# Patient Record
Sex: MALE | Race: WHITE | NOT HISPANIC OR LATINO | Employment: UNEMPLOYED | URBAN - METROPOLITAN AREA
[De-identification: names, ages, dates, MRNs, and addresses within clinical notes are randomized per-mention and may not be internally consistent; named-entity substitution may affect disease eponyms.]

---

## 2017-10-02 ENCOUNTER — APPOINTMENT (EMERGENCY)
Dept: RADIOLOGY | Facility: HOSPITAL | Age: 47
End: 2017-10-02
Payer: COMMERCIAL

## 2017-10-02 ENCOUNTER — APPOINTMENT (EMERGENCY)
Dept: NON INVASIVE DIAGNOSTICS | Facility: HOSPITAL | Age: 47
End: 2017-10-02
Payer: COMMERCIAL

## 2017-10-02 ENCOUNTER — HOSPITAL ENCOUNTER (EMERGENCY)
Facility: HOSPITAL | Age: 47
Discharge: HOME | End: 2017-10-02
Attending: EMERGENCY MEDICINE | Admitting: EMERGENCY MEDICINE
Payer: COMMERCIAL

## 2017-10-02 VITALS
RESPIRATION RATE: 18 BRPM | TEMPERATURE: 98.1 F | HEART RATE: 61 BPM | BODY MASS INDEX: 29.8 KG/M2 | WEIGHT: 220 LBS | SYSTOLIC BLOOD PRESSURE: 171 MMHG | OXYGEN SATURATION: 97 % | DIASTOLIC BLOOD PRESSURE: 97 MMHG | HEIGHT: 72 IN

## 2017-10-02 DIAGNOSIS — M79.609 LIMB PAIN: ICD-10-CM

## 2017-10-02 DIAGNOSIS — M79.661 PAIN AND SWELLING OF RIGHT LOWER LEG: Primary | ICD-10-CM

## 2017-10-02 DIAGNOSIS — M79.89 PAIN AND SWELLING OF RIGHT LOWER LEG: Primary | ICD-10-CM

## 2017-10-02 LAB
CRP SERPL QL: 9.5 MG/L
ERYTHROCYTE [SEDIMENTATION RATE] IN BLOOD: 3 MM/HOUR (ref 0–10)

## 2017-10-02 PROCEDURE — 85613 RUSSELL VIPER VENOM DILUTED: CPT | Performed by: EMERGENCY MEDICINE

## 2017-10-02 PROCEDURE — 85670 THROMBIN TIME PLASMA: CPT | Performed by: EMERGENCY MEDICINE

## 2017-10-02 PROCEDURE — 86617 LYME DISEASE ANTIBODY: CPT | Performed by: EMERGENCY MEDICINE

## 2017-10-02 PROCEDURE — 86618 LYME DISEASE ANTIBODY: CPT | Performed by: EMERGENCY MEDICINE

## 2017-10-02 PROCEDURE — 93971 EXTREMITY STUDY: CPT

## 2017-10-02 PROCEDURE — 85732 THROMBOPLASTIN TIME PARTIAL: CPT | Performed by: EMERGENCY MEDICINE

## 2017-10-02 PROCEDURE — 36415 COLL VENOUS BLD VENIPUNCTURE: CPT | Performed by: EMERGENCY MEDICINE

## 2017-10-02 PROCEDURE — 73630 X-RAY EXAM OF FOOT: CPT | Performed by: EMERGENCY MEDICINE

## 2017-10-02 PROCEDURE — 99284 EMERGENCY DEPT VISIT MOD MDM: CPT

## 2017-10-02 PROCEDURE — 86038 ANTINUCLEAR ANTIBODIES: CPT | Performed by: EMERGENCY MEDICINE

## 2017-10-02 PROCEDURE — 86430 RHEUMATOID FACTOR TEST QUAL: CPT | Performed by: EMERGENCY MEDICINE

## 2017-10-02 PROCEDURE — 86140 C-REACTIVE PROTEIN: CPT | Performed by: EMERGENCY MEDICINE

## 2017-10-02 PROCEDURE — 85652 RBC SED RATE AUTOMATED: CPT | Performed by: EMERGENCY MEDICINE

## 2017-10-02 PROCEDURE — 85705 THROMBOPLASTIN INHIBITION: CPT | Performed by: EMERGENCY MEDICINE

## 2017-10-02 PROCEDURE — 73610 X-RAY EXAM OF ANKLE: CPT | Performed by: EMERGENCY MEDICINE

## 2017-10-02 RX ORDER — ZOLPIDEM TARTRATE 12.5 MG/1
12.5 TABLET, FILM COATED, EXTENDED RELEASE ORAL
COMMUNITY

## 2017-10-02 RX ORDER — IBUPROFEN 600 MG/1
600 TABLET ORAL EVERY 6 HOURS PRN
Qty: 30 TABLET | Refills: 0 | Status: SHIPPED | OUTPATIENT
Start: 2017-10-02

## 2017-10-02 RX ORDER — HYDROCODONE BITARTRATE AND ACETAMINOPHEN 5; 325 MG/1; MG/1
1 TABLET ORAL EVERY 4 HOURS PRN
Qty: 12 TABLET | Refills: 0 | Status: SHIPPED | OUTPATIENT
Start: 2017-10-02

## 2017-10-02 NOTE — DISCHARGE INSTRUCTIONS
Leg Pain   WHAT YOU NEED TO KNOW:   Leg pain may be caused by a variety of health conditions  Your tests did not show any broken bones or blood clots  DISCHARGE INSTRUCTIONS:   Return to the emergency department if:   · You have a fever  · Your leg starts to swell  · Your leg pain gets worse  · You have numbness or tingling in your leg or toes  · You cannot put any weight on or move your leg  Contact your healthcare provider if:   · Your pain does not decrease, even after treatment  · You have questions or concerns about your condition or care  Medicines:   · NSAIDs , such as ibuprofen, help decrease swelling, pain, and fever  This medicine is available with or without a doctor's order  NSAIDs can cause stomach bleeding or kidney problems in certain people  If you take blood thinner medicine, always ask your healthcare provider if NSAIDs are safe for you  Always read the medicine label and follow directions  · Take your medicine as directed  Contact your healthcare provider if you think your medicine is not helping or if you have side effects  Tell him of her if you are allergic to any medicine  Keep a list of the medicines, vitamins, and herbs you take  Include the amounts, and when and why you take them  Bring the list or the pill bottles to follow-up visits  Carry your medicine list with you in case of an emergency  Follow up with your healthcare provider as directed: You may need more tests to find the cause of your leg pain  You may need to see an orthopedic specialist or a physical therapist  Write down your questions so you remember to ask them during your visits  Manage your leg pain:   · Rest  your injured leg so that it can heal  You may need an immobilizer, brace, or splint to limit the movement of your leg  You may need to avoid putting any weight on your leg for at least 48 hours  Return to normal activities as directed      · Ice  the injury for 20 minutes every 4 hours for up to 24 hours, or as directed  Use an ice pack, or put crushed ice in a plastic bag  Cover it with a towel to protect your skin  Ice helps prevent tissue damage and decreases swelling and pain  · Elevate  your injured leg above the level of your heart as often as you can  This will help decrease swelling and pain  If possible, prop your leg on pillows or blankets to keep the area elevated comfortably  · Use assistive devices as directed  You may need to use a cane or crutches  Assistive devices help decrease pain and pressure on your leg when you walk  Ask your healthcare provider for more information about assistive devices and how to use them correctly  · Maintain a healthy weight  Extra body weight can cause pressure and pain in your hip, knee, and ankle joints  Ask your healthcare provider how much you should weigh  Ask him to help you create a weight loss plan if you are overweight  © 2017 [employer] Information is for End User's use only and may not be sold, redistributed or otherwise used for commercial purposes  All illustrations and images included in CareNotes® are the copyrighted property of [employer]  or [employer]  The above information is an  only  It is not intended as medical advice for individual conditions or treatments  Talk to your doctor, nurse or pharmacist before following any medical regimen to see if it is safe and effective for you  Edema   WHAT YOU NEED TO KNOW:   Edema is swelling throughout your body  Edema is usually a sign that you are retaining fluid  The swelling may be caused by heart failure or kidney, thyroid, or liver disease  It may also be caused by medicines such as antidepressants, blood pressure medicines, or hormones  Sudden swelling around the lips or face may be a sign of a severe allergic reaction  Swelling of an arm or leg may be caused by blockage of your veins     DISCHARGE INSTRUCTIONS:   Return to the emergency department if:   · You have shortness of breath at rest, especially when you lie down  · You cough up pink, foamy sputum  · You have chest pain  · Your heartbeat is fast or uneven  Contact your healthcare provider if:   · The swollen area feels cold and is pale or blue in color  · The swollen area feels warm, painful, and is red in color  · You have increased swelling or swelling in other parts of your body  · You have questions or concerns about your condition or care  Medicines:   · Medicines  help to get rid of extra body fluid  · Take your medicine as directed  Contact your healthcare provider if you think your medicine is not helping or if you have side effects  Tell him or her if you are allergic to any medicine  Keep a list of the medicines, vitamins, and herbs you take  Include the amounts, and when and why you take them  Bring the list or the pill bottles to follow-up visits  Carry your medicine list with you in case of an emergency  Follow up with your healthcare provider as directed:  Write down your questions so you remember to ask them during your visits  Manage edema:   · Elevate  your arms or legs as directed  Raise them above the level of your heart as often as you can  This will help decrease swelling and pain  Prop them on pillows or blankets to keep them elevated comfortably  · Wear pressure stockings as directed  The stockings are tight and put pressure on your legs  This helps to keep fluid from collecting in your legs or ankles  · Limit your salt intake  Salt causes your body to hold water  Ask about any other changes to your diet  · Stay active  Do not stand or sit for long periods of time  Ask your healthcare provider about the best exercise plan for you  · Keep your skin moist  using lotion, cream, or ointment  Ask your healthcare provider what to use and how often to use it    © 2017 [employer] Information is for End User's use only and may not be sold, redistributed or otherwise used for commercial purposes  All illustrations and images included in CareNotes® are the copyrighted property of [employer]  or [employer]  The above information is an  only  It is not intended as medical advice for individual conditions or treatments  Talk to your doctor, nurse or pharmacist before following any medical regimen to see if it is safe and effective for you

## 2017-10-02 NOTE — ED NOTES
Discharge instructions reviewed with patient  All questions and concerns addressed        Juan Jose Arzate RN  10/02/17 1980

## 2017-10-02 NOTE — ED NOTES
Xray at bedside     Timoteo Urena, [address] [facility]  10/02/17 2964

## 2017-10-03 LAB
B BURGDOR IGG SER IA-ACNC: 0.93
B BURGDOR IGM SER IA-ACNC: 0.28
RHEUMATOID FACT SER QL LA: NEGATIVE

## 2017-10-03 NOTE — ED PROVIDER NOTES
History  Chief Complaint   Patient presents with    Ankle Swelling     Patient reports right ankle and foot pain and swelling x1 month  Had XR at [facility] 1 month ago and referred for MRI with progression of pain  Patient reports that pain is not radiating to right shin  Here w/ pain/swelling to right foot/ankle x1m  Had xray at [facility] 1m ago that was neg  Referred for outpt MRI - hasn't had yet  Notes throbbing pain, wrose w/ prolonged standing, better when elevated  Pain in foot/ankle and into shin  Denies pain above the knee  No pain in groin or thigh  No cp/sob  No travel, surg, immob  No trauma  No fh of dvt/pe  No f/c, no changes in wt  No migratory arthralgias  No ticks  History provided by:  Patient   used: No    Ankle Swelling   Location:  Leg, ankle and foot  Time since incident:  1 month  Injury: no    Leg location:  R lower leg  Ankle location:  R ankle  Foot location:  R foot  Pain details:     Quality:  Aching    Radiates to:  Does not radiate    Severity:  Moderate    Onset quality:  Gradual    Duration:  1 month    Timing:  Constant    Progression:  Worsening  Chronicity:  New  Dislocation: no    Prior injury to area:  No  Relieved by:  Elevation  Worsened by:  Bearing weight, exercise and activity  Ineffective treatments:  None tried  Associated symptoms: swelling    Associated symptoms: no back pain, no decreased ROM, no fatigue, no fever, no muscle weakness, no neck pain, no numbness, no stiffness and no tingling    Risk factors: no concern for non-accidental trauma, no obesity and no recent illness        Prior to Admission Medications   Prescriptions Last Dose Informant Patient Reported?  Taking?   zolpidem (AMBIEN CR) 12 5 MG CR tablet   Yes Yes   Sig: Take 12 5 mg by mouth daily at bedtime as needed for sleep      Facility-Administered Medications: None       Past Medical History:   Diagnosis Date    Insomnia        Past Surgical History:   Procedure Laterality Date    WISDOM TOOTH EXTRACTION         History reviewed  No pertinent family history  I have reviewed and agree with the history as documented  Social History   Substance Use Topics    Smoking status: Never Smoker    Smokeless tobacco: Current User     Types: Chew    Alcohol use Not on file        Review of Systems   Constitutional: Negative for fatigue and fever  Respiratory: Negative for chest tightness and shortness of breath  Cardiovascular: Negative for chest pain  Musculoskeletal: Negative for back pain, neck pain and stiffness  All other systems reviewed and are negative  Physical Exam  ED Triage Vitals [10/02/17 0848]   Temperature Pulse Respirations Blood Pressure SpO2   98 1 °F (36 7 °C) 67 16 (!) 176/96 97 %      Temp Source Heart Rate Source Patient Position - Orthostatic VS BP Location FiO2 (%)   Tympanic Monitor Lying Right arm --      Pain Score       7           Physical Exam   Constitutional: He appears well-developed and well-nourished  HENT:   Nose: Nose normal    Mouth/Throat: Oropharynx is clear and moist    Eyes: Conjunctivae are normal    Neck: Neck supple  Cardiovascular: Normal rate  Pulmonary/Chest: Effort normal    Musculoskeletal: He exhibits edema (1-2+ ptting right leg/ankle) and tenderness  He exhibits no deformity  Neurological: He is alert  Skin: Skin is warm  No rash noted  Psychiatric: He has a normal mood and affect  Nursing note and vitals reviewed  ED Medications  Medications - No data to display    Diagnostic Studies  Labs Reviewed - No data to display    VAS lower limb venous duplex study, unilateral/limited   Final Result      XR ankle 3+ views RIGHT   Final Result      Soft tissue swelling  No acute osseous abnormality  Workstation performed: [id number]         XR foot 3+ views RIGHT   Final Result      No acute osseous abnormality           Workstation performed: [id number]           Venous duplex - negative    Procedures  Procedures      Phone Contacts  ED Phone Contact    ED Course  ED Course                                MDM  Number of Diagnoses or Management Options  Pain and swelling of right lower leg: new and requires workup     Amount and/or Complexity of Data Reviewed  Clinical lab tests: ordered and reviewed  Tests in the radiology section of CPT®: ordered and reviewed      CritCare Time    Disposition  Final diagnoses:   Pain and swelling of right lower leg     ED Disposition     ED Disposition Condition Comment    Discharge  Kiya Maddox discharge to home/self care  Condition at discharge: Good        Follow-up Information     Follow up With Specialties Details Why [id number] Lucy Kay DO Family Medicine In 1 week If symptoms worsen [id number] [address] [facility] 1  [phone]          Discharge Medication List as of 10/2/2017 11:02 AM      START taking these medications    Details   HYDROcodone-acetaminophen (NORCO) 5-325 mg per tablet Take 1 tablet by mouth every 4 (four) hours as needed for pain Max Daily Amount: 6 tablets, Starting Mon 10/2/2017, Print      ibuprofen (MOTRIN) 600 mg tablet Take 1 tablet by mouth every 6 (six) hours as needed for mild pain or moderate pain, Starting Mon 10/2/2017, Print         CONTINUE these medications which have NOT CHANGED    Details   zolpidem (AMBIEN CR) 12 5 MG CR tablet Take 12 5 mg by mouth daily at bedtime as needed for sleep, Historical Med           No discharge procedures on file      ED Provider  Electronically Signed by       Naomy Guillory DO  10/02/17 2005

## 2017-10-04 LAB
APTT SCREEN TO CONFIRM RATIO: 1.03 RATIO (ref 0–1.4)
B BURGDOR IGG PATRN SER IB-IMP: NEGATIVE
B BURGDOR IGM PATRN SER IB-IMP: NEGATIVE
B BURGDOR18KD IGG SER QL IB: NORMAL
B BURGDOR23KD IGG SER QL IB: NORMAL
B BURGDOR23KD IGM SER QL IB: NORMAL
B BURGDOR28KD IGG SER QL IB: NORMAL
B BURGDOR30KD IGG SER QL IB: NORMAL
B BURGDOR39KD IGG SER QL IB: NORMAL
B BURGDOR39KD IGM SER QL IB: NORMAL
B BURGDOR41KD IGG SER QL IB: NORMAL
B BURGDOR41KD IGM SER QL IB: NORMAL
B BURGDOR45KD IGG SER QL IB: NORMAL
B BURGDOR58KD IGG SER QL IB: NORMAL
B BURGDOR66KD IGG SER QL IB: NORMAL
B BURGDOR93KD IGG SER QL IB: NORMAL
CONFIRM APTT/NORMAL: 40.6 SEC (ref 0–55)
LA PPP-IMP: NORMAL
RYE IGE QN: NEGATIVE
SCREEN APTT: 45.8 SEC (ref 0–51.9)
SCREEN DRVVT: 44.1 SEC (ref 0–47)
THROMBIN TIME: 18.7 SEC (ref 0–23)

## 2023-07-10 ENCOUNTER — APPOINTMENT (EMERGENCY)
Dept: CT IMAGING | Facility: HOSPITAL | Age: 53
DRG: 065 | End: 2023-07-10
Payer: COMMERCIAL

## 2023-07-10 ENCOUNTER — HOSPITAL ENCOUNTER (INPATIENT)
Facility: HOSPITAL | Age: 53
LOS: 3 days | Discharge: HOME | DRG: 065 | End: 2023-07-13
Attending: EMERGENCY MEDICINE | Admitting: STUDENT IN AN ORGANIZED HEALTH CARE EDUCATION/TRAINING PROGRAM
Payer: COMMERCIAL

## 2023-07-10 ENCOUNTER — APPOINTMENT (INPATIENT)
Dept: MRI IMAGING | Facility: HOSPITAL | Age: 53
DRG: 065 | End: 2023-07-10
Payer: COMMERCIAL

## 2023-07-10 DIAGNOSIS — Z78.9 ALCOHOL USE: ICD-10-CM

## 2023-07-10 DIAGNOSIS — R29.90 STROKE-LIKE EPISODE: ICD-10-CM

## 2023-07-10 DIAGNOSIS — D69.6 THROMBOCYTOPENIA (HCC): ICD-10-CM

## 2023-07-10 DIAGNOSIS — R29.898 LEFT ARM WEAKNESS: Primary | ICD-10-CM

## 2023-07-10 DIAGNOSIS — R74.01 TRANSAMINITIS: ICD-10-CM

## 2023-07-10 LAB
2HR DELTA HS TROPONIN: 0 NG/L
4HR DELTA HS TROPONIN: 0 NG/L
ALBUMIN SERPL BCP-MCNC: 3.9 G/DL (ref 3.5–5)
ALP SERPL-CCNC: 125 U/L (ref 34–104)
ALT SERPL W P-5'-P-CCNC: 23 U/L (ref 7–52)
AMMONIA PLAS-SCNC: 72 UMOL/L (ref 18–72)
ANION GAP SERPL CALCULATED.3IONS-SCNC: 8 MMOL/L
APTT PPP: 37 SECONDS (ref 23–37)
AST SERPL W P-5'-P-CCNC: 49 U/L (ref 13–39)
BILIRUB DIRECT SERPL-MCNC: 0.44 MG/DL (ref 0–0.2)
BILIRUB SERPL-MCNC: 1.08 MG/DL (ref 0.2–1)
BUN SERPL-MCNC: 11 MG/DL (ref 5–25)
CALCIUM SERPL-MCNC: 9.5 MG/DL (ref 8.4–10.2)
CARDIAC TROPONIN I PNL SERPL HS: 6 NG/L
CHLORIDE SERPL-SCNC: 108 MMOL/L (ref 96–108)
CHOLEST SERPL-MCNC: 136 MG/DL
CO2 SERPL-SCNC: 24 MMOL/L (ref 21–32)
CREAT SERPL-MCNC: 1.18 MG/DL (ref 0.6–1.3)
ERYTHROCYTE [DISTWIDTH] IN BLOOD BY AUTOMATED COUNT: 14.2 % (ref 11.6–15.1)
ETHANOL SERPL-MCNC: 103 MG/DL
GFR SERPL CREATININE-BSD FRML MDRD: 70 ML/MIN/1.73SQ M
GLUCOSE SERPL-MCNC: 109 MG/DL (ref 65–140)
HAV AB SER QL IA: NORMAL
HAV IGM SER QL: NORMAL
HBV CORE AB SER QL: NORMAL
HBV CORE IGM SER QL: NORMAL
HBV SURFACE AB SER-ACNC: >500 MIU/ML
HBV SURFACE AG SER QL: NORMAL
HCT VFR BLD AUTO: 38 % (ref 36.5–49.3)
HCV AB SER QL: NORMAL
HDLC SERPL-MCNC: 38 MG/DL
HGB BLD-MCNC: 12.1 G/DL (ref 12–17)
INR PPP: 1.25 (ref 0.84–1.19)
LDLC SERPL CALC-MCNC: 64 MG/DL (ref 0–100)
MCH RBC QN AUTO: 28.9 PG (ref 26.8–34.3)
MCHC RBC AUTO-ENTMCNC: 31.8 G/DL (ref 31.4–37.4)
MCV RBC AUTO: 91 FL (ref 82–98)
PLATELET # BLD AUTO: 120 THOUSANDS/UL (ref 149–390)
PMV BLD AUTO: 10.4 FL (ref 8.9–12.7)
POTASSIUM SERPL-SCNC: 4 MMOL/L (ref 3.5–5.3)
PROT SERPL-MCNC: 6.7 G/DL (ref 6.4–8.4)
PROTHROMBIN TIME: 16.5 SECONDS (ref 11.6–14.5)
RBC # BLD AUTO: 4.19 MILLION/UL (ref 3.88–5.62)
SODIUM SERPL-SCNC: 140 MMOL/L (ref 135–147)
TRIGL SERPL-MCNC: 170 MG/DL
WBC # BLD AUTO: 7.53 THOUSAND/UL (ref 4.31–10.16)

## 2023-07-10 PROCEDURE — A9585 GADOBUTROL INJECTION: HCPCS | Performed by: STUDENT IN AN ORGANIZED HEALTH CARE EDUCATION/TRAINING PROGRAM

## 2023-07-10 PROCEDURE — 87389 HIV-1 AG W/HIV-1&-2 AB AG IA: CPT | Performed by: PHYSICIAN ASSISTANT

## 2023-07-10 PROCEDURE — 80061 LIPID PANEL: CPT | Performed by: STUDENT IN AN ORGANIZED HEALTH CARE EDUCATION/TRAINING PROGRAM

## 2023-07-10 PROCEDURE — 82077 ASSAY SPEC XCP UR&BREATH IA: CPT | Performed by: STUDENT IN AN ORGANIZED HEALTH CARE EDUCATION/TRAINING PROGRAM

## 2023-07-10 PROCEDURE — 99291 CRITICAL CARE FIRST HOUR: CPT | Performed by: EMERGENCY MEDICINE

## 2023-07-10 PROCEDURE — 86706 HEP B SURFACE ANTIBODY: CPT | Performed by: STUDENT IN AN ORGANIZED HEALTH CARE EDUCATION/TRAINING PROGRAM

## 2023-07-10 PROCEDURE — 85730 THROMBOPLASTIN TIME PARTIAL: CPT | Performed by: EMERGENCY MEDICINE

## 2023-07-10 PROCEDURE — 84484 ASSAY OF TROPONIN QUANT: CPT | Performed by: EMERGENCY MEDICINE

## 2023-07-10 PROCEDURE — NC001 PR NO CHARGE: Performed by: STUDENT IN AN ORGANIZED HEALTH CARE EDUCATION/TRAINING PROGRAM

## 2023-07-10 PROCEDURE — 36415 COLL VENOUS BLD VENIPUNCTURE: CPT | Performed by: EMERGENCY MEDICINE

## 2023-07-10 PROCEDURE — 86704 HEP B CORE ANTIBODY TOTAL: CPT | Performed by: STUDENT IN AN ORGANIZED HEALTH CARE EDUCATION/TRAINING PROGRAM

## 2023-07-10 PROCEDURE — 85610 PROTHROMBIN TIME: CPT | Performed by: EMERGENCY MEDICINE

## 2023-07-10 PROCEDURE — 70498 CT ANGIOGRAPHY NECK: CPT

## 2023-07-10 PROCEDURE — 80076 HEPATIC FUNCTION PANEL: CPT | Performed by: STUDENT IN AN ORGANIZED HEALTH CARE EDUCATION/TRAINING PROGRAM

## 2023-07-10 PROCEDURE — 99285 EMERGENCY DEPT VISIT HI MDM: CPT

## 2023-07-10 PROCEDURE — 99223 1ST HOSP IP/OBS HIGH 75: CPT | Performed by: STUDENT IN AN ORGANIZED HEALTH CARE EDUCATION/TRAINING PROGRAM

## 2023-07-10 PROCEDURE — 92610 EVALUATE SWALLOWING FUNCTION: CPT

## 2023-07-10 PROCEDURE — 86708 HEPATITIS A ANTIBODY: CPT | Performed by: STUDENT IN AN ORGANIZED HEALTH CARE EDUCATION/TRAINING PROGRAM

## 2023-07-10 PROCEDURE — 85027 COMPLETE CBC AUTOMATED: CPT | Performed by: EMERGENCY MEDICINE

## 2023-07-10 PROCEDURE — 70553 MRI BRAIN STEM W/O & W/DYE: CPT

## 2023-07-10 PROCEDURE — 83036 HEMOGLOBIN GLYCOSYLATED A1C: CPT | Performed by: STUDENT IN AN ORGANIZED HEALTH CARE EDUCATION/TRAINING PROGRAM

## 2023-07-10 PROCEDURE — 80307 DRUG TEST PRSMV CHEM ANLYZR: CPT | Performed by: STUDENT IN AN ORGANIZED HEALTH CARE EDUCATION/TRAINING PROGRAM

## 2023-07-10 PROCEDURE — 93005 ELECTROCARDIOGRAM TRACING: CPT

## 2023-07-10 PROCEDURE — 80048 BASIC METABOLIC PNL TOTAL CA: CPT | Performed by: EMERGENCY MEDICINE

## 2023-07-10 PROCEDURE — 82140 ASSAY OF AMMONIA: CPT | Performed by: STUDENT IN AN ORGANIZED HEALTH CARE EDUCATION/TRAINING PROGRAM

## 2023-07-10 PROCEDURE — 70496 CT ANGIOGRAPHY HEAD: CPT

## 2023-07-10 PROCEDURE — 80074 ACUTE HEPATITIS PANEL: CPT | Performed by: STUDENT IN AN ORGANIZED HEALTH CARE EDUCATION/TRAINING PROGRAM

## 2023-07-10 PROCEDURE — 99245 OFF/OP CONSLTJ NEW/EST HI 55: CPT | Performed by: STUDENT IN AN ORGANIZED HEALTH CARE EDUCATION/TRAINING PROGRAM

## 2023-07-10 RX ORDER — LOPERAMIDE HYDROCHLORIDE 2 MG/1
2 CAPSULE ORAL 4 TIMES DAILY PRN
Status: DISCONTINUED | OUTPATIENT
Start: 2023-07-10 | End: 2023-07-13 | Stop reason: HOSPADM

## 2023-07-10 RX ORDER — HYDROXYZINE HYDROCHLORIDE 25 MG/1
25 TABLET, FILM COATED ORAL EVERY 6 HOURS PRN
COMMUNITY

## 2023-07-10 RX ORDER — GADOBUTROL 604.72 MG/ML
10 INJECTION INTRAVENOUS
Status: COMPLETED | OUTPATIENT
Start: 2023-07-10 | End: 2023-07-10

## 2023-07-10 RX ORDER — NICOTINE 21 MG/24HR
1 PATCH, TRANSDERMAL 24 HOURS TRANSDERMAL DAILY
Status: DISCONTINUED | OUTPATIENT
Start: 2023-07-10 | End: 2023-07-13 | Stop reason: HOSPADM

## 2023-07-10 RX ORDER — LOSARTAN POTASSIUM 100 MG/1
100 TABLET ORAL 2 TIMES DAILY
COMMUNITY

## 2023-07-10 RX ORDER — ASPIRIN 81 MG/1
81 TABLET, CHEWABLE ORAL DAILY
Status: DISCONTINUED | OUTPATIENT
Start: 2023-07-11 | End: 2023-07-13 | Stop reason: HOSPADM

## 2023-07-10 RX ORDER — ACETAMINOPHEN 325 MG/1
650 TABLET ORAL EVERY 6 HOURS PRN
Status: DISCONTINUED | OUTPATIENT
Start: 2023-07-10 | End: 2023-07-13 | Stop reason: HOSPADM

## 2023-07-10 RX ORDER — LOPERAMIDE HYDROCHLORIDE 2 MG/1
2 CAPSULE ORAL 4 TIMES DAILY PRN
COMMUNITY

## 2023-07-10 RX ORDER — CLOPIDOGREL BISULFATE 75 MG/1
300 TABLET ORAL ONCE
Status: COMPLETED | OUTPATIENT
Start: 2023-07-10 | End: 2023-07-10

## 2023-07-10 RX ORDER — ASPIRIN 325 MG
325 TABLET ORAL ONCE
Status: COMPLETED | OUTPATIENT
Start: 2023-07-10 | End: 2023-07-10

## 2023-07-10 RX ORDER — CHLORTHALIDONE 25 MG/1
25 TABLET ORAL DAILY
COMMUNITY

## 2023-07-10 RX ORDER — ZOLPIDEM TARTRATE 5 MG/1
10 TABLET ORAL
Status: DISCONTINUED | OUTPATIENT
Start: 2023-07-10 | End: 2023-07-13 | Stop reason: HOSPADM

## 2023-07-10 RX ORDER — ROPINIROLE 0.5 MG/1
0.5 TABLET, FILM COATED ORAL
COMMUNITY

## 2023-07-10 RX ORDER — ROPINIROLE 0.25 MG/1
0.5 TABLET, FILM COATED ORAL
Status: DISCONTINUED | OUTPATIENT
Start: 2023-07-10 | End: 2023-07-13 | Stop reason: HOSPADM

## 2023-07-10 RX ORDER — ATORVASTATIN CALCIUM 40 MG/1
40 TABLET, FILM COATED ORAL EVERY EVENING
Status: DISCONTINUED | OUTPATIENT
Start: 2023-07-10 | End: 2023-07-11

## 2023-07-10 RX ORDER — METOPROLOL SUCCINATE 100 MG/1
100 TABLET, EXTENDED RELEASE ORAL DAILY
COMMUNITY

## 2023-07-10 RX ORDER — LORAZEPAM 2 MG/ML
0.5 INJECTION INTRAMUSCULAR ONCE
Status: COMPLETED | OUTPATIENT
Start: 2023-07-10 | End: 2023-07-10

## 2023-07-10 RX ORDER — AMLODIPINE BESYLATE 10 MG/1
10 TABLET ORAL DAILY
COMMUNITY
End: 2023-07-13

## 2023-07-10 RX ADMIN — LOPERAMIDE HYDROCHLORIDE 2 MG: 2 CAPSULE ORAL at 21:21

## 2023-07-10 RX ADMIN — ZOLPIDEM TARTRATE 10 MG: 5 TABLET ORAL at 22:32

## 2023-07-10 RX ADMIN — ASPIRIN 325 MG: 325 TABLET ORAL at 12:18

## 2023-07-10 RX ADMIN — LORAZEPAM 0.5 MG: 2 INJECTION INTRAMUSCULAR; INTRAVENOUS at 18:45

## 2023-07-10 RX ADMIN — CLOPIDOGREL BISULFATE 300 MG: 75 TABLET ORAL at 12:00

## 2023-07-10 RX ADMIN — GADOBUTROL 10 ML: 604.72 INJECTION INTRAVENOUS at 15:16

## 2023-07-10 RX ADMIN — ACETAMINOPHEN 650 MG: 325 TABLET, FILM COATED ORAL at 21:21

## 2023-07-10 RX ADMIN — NICOTINE 1 PATCH: 14 PATCH, EXTENDED RELEASE TRANSDERMAL at 17:21

## 2023-07-10 RX ADMIN — IOHEXOL 85 ML: 350 INJECTION, SOLUTION INTRAVENOUS at 11:06

## 2023-07-10 RX ADMIN — ATORVASTATIN CALCIUM 40 MG: 40 TABLET, FILM COATED ORAL at 18:44

## 2023-07-10 RX ADMIN — ROPINIROLE HYDROCHLORIDE 0.5 MG: 0.25 TABLET, FILM COATED ORAL at 22:32

## 2023-07-10 NOTE — ASSESSMENT & PLAN NOTE
· Present on admission- slurred speech, weakness, double vision  · No previous history of stroke  · Initial neuroimaging negative for acute pathology  · Neurology was consulted, not a candidate for tPA  Admit to stroke pathway, follow up results   · Loaded with aspirin and plavix in ED  · Neurology recommending further evaluation of cirrhosis- check LFT, hepatitis panel, alcohol level, etc
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
## 2023-07-10 NOTE — SPEECH THERAPY NOTE
Speech Language/Pathology    Speech-Language Pathology Bedside Swallow Evaluation      Patient Name: Gonzalo Sheriff    [id number] Date: 7/10/2023     Problem List  Principal Problem:    Stroke-like episode      Past Medical History  Past Medical History:   Diagnosis Date   • Insomnia        Past Surgical History  Past Surgical History:   Procedure Laterality Date   • WISDOM TOOTH EXTRACTION         Summary   Pt presented with s/s suggestive of minimal oral dysphagia. Pt w/ lethargy and slowed oral manipulation though effective mastication of all material presented. Adequate transfers without significant residue. Swallows suspected fairly prompt. No overt s/s aspiration. Education provided on strategies to optimize swallow safety and s/s aspiration to notify medical team of should they arise. Pt demonstrated understanding and denied questions/concerns at this time. Risk/s for Aspiration: Low      Recommended Diet: regular diet and thin liquids   Recommended Form of Meds: whole with liquid   Aspiration precautions and swallowing strategies: upright posture, only feed when fully alert and slow rate of feeding  Other Recommendations: Continue frequent oral care        Current Medical Status  Pt is a 47 y.o. male who presented to [address] [place] [address] with  History of htn, insomnia, reports he was tired last night but no other symptoms, he was his normal state of health,he went to bed at around 10 p.m., he awoken this a..m at 7:30 he was off balance/slurred speech, when he was driving to work he noted horizontal diplopia which is improving, when he covered the other eye it improved, but worsened when the eye was not covered. This never happened before. It was worse at a distance, he could not tell if worse at one side or another. He reports his diplopia is improving. When he was walking following to the right. The patient did have vertigo.   He awoken with this symptoms.      The patient went to work and logged into a computer system, he could not log into the computer, he could not figure out his user name or pass word, he was unable to coordinate his fingers on the right at that time. He was having problems with right handed coordination. He was constantly stubbing on the right side. He felt as if he was drunk.      He was able to walk to the car, he was brought to the ED by his wife whom picked him up.      He does take ambien, on no opioids, amlodipine, lisinopril, and metoprolol. He takes Requip for RLS.    CT of head, CTA of head and neck no lvo.     He is not a awake up stroke candidate.      The patient is not a tnk candidate, out of the window, not thrombectomy candidate. Current Precautions: Allergies:  No known food allergies    Past medical history:  Please see H&P for details    Special Studies:  CT stroke alert brain 7/10: No acute intracranial abnormality    CTA stroke alert head/neck 7/10: No flow-limiting cerebrovascular stenosis or occlusion in the neck.     No flow-limiting cerebrovascular stenosis or occlusion in the head      Social/Education/Vocational Hx:  Pt lives with family    Swallow Information   Current Risks for Dysphagia & Aspiration: stroke-like symptoms  Current Symptoms/Concerns: dysarthria, failed screen  Current Diet: NPO   Baseline Diet: regular diet and thin liquids      Baseline Assessment   Behavior/Cognition: lethargic  Speech/Language Status: able to participate in conversation and able to follow commands, ?min dysarthria vs lethargy   Patient Positioning: upright in bed  Pain Status/Interventions/Response to Interventions:  No report of or nonverbal indications of pain.        Swallow Mechanism Exam  Facial: symmetrical  Labial: WFL  Lingual: WFL  Velum: symmetrical  Mandible: adequate ROM  Dentition: adequate  Vocal quality:clear/adequate   Volitional Cough: strong/productive   Respiratory Status: on RA      Consistencies Assessed and Performance   Consistencies Administered: thin liquids, puree, soft solids and hard solids    Oral Stage: minimal  Mastication was adequate with the materials administered today, min slowed lingual manipulation. Bolus formation and transfer were functional with no significant oral residue noted. No overt s/s reduced oral control. Pharyngeal Stage: WFL  Swallow Mechanics:  Swallowing initiation appeared prompt. Laryngeal rise was palpated and judged to be within functional limits. No coughing, throat clearing, change in vocal quality or respiratory status noted today. Esophageal Concerns: none reported    Strategies and Efficacy: -     Summary and Recommendations (see above)    Results Reviewed with: patient, RN and family     Treatment Recommended: Yes     Frequency of treatment: Brief f/u to assess across larger sample as able/determine need for further speech evaluation     Patient Stated Goal: "Kind of hungry kind of not"     Dysphagia LTG  -Patient will demonstrate safe and effective oral intake (without overt s/s significant oral/pharyngeal dysphagia including s/s penetration or aspiration) for the highest appropriate diet level.        Speech Therapy Prognosis   Prognosis: good    Prognosis Considerations: age, medical status, prior medical history and cognitive status

## 2023-07-10 NOTE — LETTER
[facility]  [address] [place] [id number]  Dept: [phone]    July 13, 2023     Patient: Gerson Culp   YOB: 1970   Date of Visit: 7/10/2023       To Whom it May Concern:    Gerson Culp is under my professional care. He was seen in the hospital from 7/10/2023 to 07/13/23. He  may return to work on 7/20/2023 pending follow-up with PCP . If you have any questions or concerns, please don't hesitate to call.          Sincerely,          Chicho Sheppard PA-C

## 2023-07-10 NOTE — ASSESSMENT & PLAN NOTE
47year old male, who presents with horizontal diplopia, as well as dysarthria and a feeling of being pulled to the right. NIH of 1. The patient is not at tnk candidate, as he is out of the window secondary to time. The patient is not a thrombectomy candidate, no lvo seen or critical care stenosis. The patient was not a candidate for awake up stroke protocol with his low NIH. Labs and testing:  MRI the brain with no acute infarct, edema or pathological intra-axial enhancement. CTA of the head and neck showed no flow-limiting stenosis or occlusion the neck or head. Echo  LDL was 64, trig 170H  Ammonia was 72  HIV normal  TSH normal  Ethanol was 103  Platelets 074 L  AST 49    Suspect MRI negative stroke vs TIA. - Stroke pathway, admit to medicine team  • Echo with bubble study  • Hemoglobin A1c pending   • Load with Aspirin 325 mg once, Plavix 300 mg once, followed by Aspirin 81 mg and Plavix 75 mg, starting 7/11, this will be completed for 21 days, than aspirin monotherapy. • Atorvastatin 40 mg, will decrease dose to 10 mg as he has elevated AST and signs of liver disease - as well LDL was 64. • Normotensive blood pressure  • Continue telemetry  • PT/OT/ST  • Frequent neuro checks. Continue to monitor and notify neurology with any changes. - Medical management and supportive care per primary team. Correction of any metabolic or infectious disturbances. The patient had an elevated ammonia, decreased plts elevated AST. -Counseled on etoh cessation.   -Examined alongside attending, please see attestation for details.

## 2023-07-10 NOTE — H&P
[address] [facility]  H&P  Name: Gonzalo Sheriff 47 y.o. male I MRN: [id number]  Unit/Bed#: ED 02 I Date of Admission: 7/10/2023   Date of Service: 7/10/2023 I Hospital Day: 0      Assessment/Plan   * Stroke-like episode  Assessment & Plan  · Present on admission- slurred speech, weakness, double vision  · No previous history of stroke  · Initial neuroimaging negative for acute pathology  · Neurology was consulted, not a candidate for tPA  Admit to stroke pathway, follow up results   · Loaded with aspirin and plavix in ED  · Neurology recommending further evaluation of cirrhosis- check LFT, hepatitis panel, alcohol level, etc     Alcohol abuse   Ethanol level elevated on arrival   Reports drinking at least 3 mixed drinks daily   No active signs of withdrawal currently, start CIWA and monitor   Previous CT AP at another hospital in March showing fatty liver     VTE Pharmacologic Prophylaxis:   Low Risk (Score 0-2) - Encourage Ambulation. Code Status: Level 1 - Full Code       Anticipated Length of Stay: Patient will be admitted on an inpatient basis with an anticipated length of stay of greater than 2 midnights secondary to stroke pathway. Total Time Spent on Date of Encounter in care of patient: 40 minutes This time was spent on one or more of the following: performing physical exam; counseling and coordination of care; obtaining or reviewing history; documenting in the medical record; reviewing/ordering tests, medications or procedures; communicating with other healthcare professionals and discussing with patient's family/caregivers. Chief Complaint: stroke like symptoms     History of Present Illness:  Gonzalo Sheriff is a 47 y.o. male with a PMH of smoker who presents with stroke like symptoms. Came to the ED for further evaluation. NIH score 1 so not a candidate for tNK. Initial neuro imaging negative for acute pathology. However, patient admitted for stroke pathway.      Review of Systems:  Review of Systems   Constitutional: Negative for chills and fever. HENT: Negative for ear pain and sore throat. Eyes: Positive for visual disturbance. Negative for pain. Respiratory: Negative for cough and shortness of breath. Cardiovascular: Negative for chest pain and palpitations. Gastrointestinal: Negative for abdominal pain and vomiting. Genitourinary: Negative for dysuria and hematuria. Musculoskeletal: Negative for arthralgias and back pain. Skin: Negative for color change and rash. Neurological: Positive for speech difficulty and weakness. Negative for seizures and syncope. All other systems reviewed and are negative. Past Medical and Surgical History:   Past Medical History:   Diagnosis Date   • Insomnia        Past Surgical History:   Procedure Laterality Date   • WISDOM TOOTH EXTRACTION         Meds/Allergies:  Prior to Admission medications    Medication Sig Start Date End Date Taking? Authorizing Provider   HYDROcodone-acetaminophen (NORCO) 5-325 mg per tablet Take 1 tablet by mouth every 4 (four) hours as needed for pain Max Daily Amount: 6 tablets 10/2/17   Florinda Henriquez DO   ibuprofen (MOTRIN) 600 mg tablet Take 1 tablet by mouth every 6 (six) hours as needed for mild pain or moderate pain 10/2/17   Florinda Henriquez DO   zolpidem (AMBIEN CR) 12.5 MG CR tablet Take 12.5 mg by mouth daily at bedtime as needed for sleep    Historical Provider, MD HURST have reviewed home medications using recent Epic encounter.     Allergies: No Known Allergies    Social History:  Marital Status: /Civil Union   Occupation: na  Patient Pre-hospital Living Situation: Home  Patient Pre-hospital Level of Mobility: walks  Patient Pre-hospital Diet Restrictions: na  Substance Use History:   Social History     Substance and Sexual Activity   Alcohol Use Never     Social History     Tobacco Use   Smoking Status Never   Smokeless Tobacco Current   • Types: Chew     Social History Substance and Sexual Activity   Drug Use No       Family History:  History reviewed. No pertinent family history. Physical Exam:     Vitals:   Blood Pressure: 111/76 (07/10/23 1140)  Pulse: 65 (07/10/23 1140)  Temperature: 97.8 °F (36.6 °C) (07/10/23 1140)  Temp Source: Oral (07/10/23 1140)  Respirations: 17 (07/10/23 1140)  SpO2: 95 % (07/10/23 1140)    Physical Exam  Constitutional:       General: He is not in acute distress. Appearance: Normal appearance. He is not toxic-appearing. Cardiovascular:      Rate and Rhythm: Normal rate and regular rhythm. Heart sounds: Normal heart sounds. No murmur heard. Pulmonary:      Effort: Pulmonary effort is normal. No respiratory distress. Breath sounds: Normal breath sounds. No wheezing. Abdominal:      General: Abdomen is flat. There is no distension. Palpations: Abdomen is soft. Tenderness: There is no abdominal tenderness. Neurological:      Mental Status: He is alert and oriented to person, place, and time. Motor: Weakness present. Additional Data:     Lab Results:  Results from last 7 days   Lab Units 07/10/23  1056   WBC Thousand/uL 7.53   HEMOGLOBIN g/dL 12.1   HEMATOCRIT % 38.0   PLATELETS Thousands/uL 120*     Results from last 7 days   Lab Units 07/10/23  1056   SODIUM mmol/L 140   POTASSIUM mmol/L 4.0   CHLORIDE mmol/L 108   CO2 mmol/L 24   BUN mg/dL 11   CREATININE mg/dL 1.18   ANION GAP mmol/L 8   CALCIUM mg/dL 9.5   GLUCOSE RANDOM mg/dL 109     Results from last 7 days   Lab Units 07/10/23  1056   INR  1.25*                   Lines/Drains:  Invasive Devices     Peripheral Intravenous Line  Duration           Peripheral IV 07/10/23 Distal;Right;Upper;Ventral (anterior) Arm <1 day                    Imaging: Reviewed radiology reports from this admission including: CT head  CT stroke alert brain   Final Result by Radhika Klein MD (07/10 1123)      No acute intracranial abnormality.          I reported these results to Dorita Soliman Arely via HIPAA compliant secure electronic messaging on 7/10/2023 11:15 AM.      Workstation performed: [id number]         CTA stroke alert (head/neck)   Final Result by Hayden Samuel MD (07/10 1123)      No flow-limiting cerebrovascular stenosis or occlusion in the neck. No flow-limiting cerebrovascular stenosis or occlusion in the head. I reported these results to Dorita Jourdan Mcgee via HIPAA compliant secure electronic messaging on 7/10/2023 11:15 AM.                        Workstation performed: [id number]         MRI Inpatient Order    (Results Pending)       EKG and Other Studies Reviewed on Admission:   · EKG: pending scan into epic chart. ** Please Note: This note has been constructed using a voice recognition system.  **

## 2023-07-10 NOTE — PLAN OF CARE
Problem: Potential for Falls  Goal: Patient will remain free of falls  Description: INTERVENTIONS:  - Educate patient/family on patient safety including physical limitations  - Instruct patient to call for assistance with activity   - Consult OT/PT to assist with strengthening/mobility   - Keep Call bell within reach  - Keep bed low and locked with side rails adjusted as appropriate  - Keep care items and personal belongings within reach  - Initiate and maintain comfort rounds  - Make Fall Risk Sign visible to staff  - Offer Toileting every x Hours, in advance of need  - Initiate/Maintain xalarm  - Obtain necessary fall risk management equipment: x  - Apply yellow socks and bracelet for high fall risk patients  - Consider moving patient to room near nurses station  Outcome: Progressing     Problem: PAIN - ADULT  Goal: Verbalizes/displays adequate comfort level or baseline comfort level  Description: Interventions:  - Encourage patient to monitor pain and request assistance  - Assess pain using appropriate pain scale  - Administer analgesics based on type and severity of pain and evaluate response  - Implement non-pharmacological measures as appropriate and evaluate response  - Consider cultural and social influences on pain and pain management  - Notify physician/advanced practitioner if interventions unsuccessful or patient reports new pain  Outcome: Progressing     Problem: INFECTION - ADULT  Goal: Absence or prevention of progression during hospitalization  Description: INTERVENTIONS:  - Assess and monitor for signs and symptoms of infection  - Monitor lab/diagnostic results  - Monitor all insertion sites, i.e. indwelling lines, tubes, and drains  - Monitor endotracheal if appropriate and nasal secretions for changes in amount and color  - [place] appropriate cooling/warming therapies per order  - Administer medications as ordered  - Instruct and encourage patient and family to use good hand hygiene technique  - Identify and instruct in appropriate isolation precautions for identified infection/condition  Outcome: Progressing  Goal: Absence of fever/infection during neutropenic period  Description: INTERVENTIONS:  - Monitor WBC    Outcome: Progressing     Problem: SAFETY ADULT  Goal: Patient will remain free of falls  Description: INTERVENTIONS:  - Educate patient/family on patient safety including physical limitations  - Instruct patient to call for assistance with activity   - Consult OT/PT to assist with strengthening/mobility   - Keep Call bell within reach  - Keep bed low and locked with side rails adjusted as appropriate  - Keep care items and personal belongings within reach  - Initiate and maintain comfort rounds  - Make Fall Risk Sign visible to staff  - Offer Toileting every x Hours, in advance of need  - Initiate/Maintain xalarm  - Obtain necessary fall risk management equipment: x  - Apply yellow socks and bracelet for high fall risk patients  - Consider moving patient to room near nurses station  Outcome: Progressing  Goal: Maintain or return to baseline ADL function  Description: INTERVENTIONS:  -  Assess patient's ability to carry out ADLs; assess patient's baseline for ADL function and identify physical deficits which impact ability to perform ADLs (bathing, care of mouth/teeth, toileting, grooming, dressing, etc.)  - Assess/evaluate cause of self-care deficits   - Assess range of motion  - Assess patient's mobility; develop plan if impaired  - Assess patient's need for assistive devices and provide as appropriate  - Encourage maximum independence but intervene and supervise when necessary  - Involve family in performance of ADLs  - Assess for home care needs following discharge   - Consider OT consult to assist with ADL evaluation and planning for discharge  - Provide patient education as appropriate  Outcome: Progressing  Goal: Maintains/Returns to pre admission functional level  Description: INTERVENTIONS:  - Perform BMAT or MOVE assessment daily.   - Set and communicate daily mobility goal to care team and patient/family/caregiver. - Collaborate with rehabilitation services on mobility goals if consulted  - Perform Range of Motion x times a day. - Reposition patient every x hours. - Dangle patient x times a day  - Stand patient x times a day  - Ambulate patient x times a day  - Out of bed to chair x times a day   - Out of bed for meals x times a day  - Out of bed for toileting  - Record patient progress and toleration of activity level   Outcome: Progressing     Problem: DISCHARGE PLANNING  Goal: Discharge to home or other facility with appropriate resources  Description: INTERVENTIONS:  - Identify barriers to discharge w/patient and caregiver  - Arrange for needed discharge resources and transportation as appropriate  - Identify discharge learning needs (meds, wound care, etc.)  - Arrange for interpretive services to assist at discharge as needed  - Refer to Case Management Department for coordinating discharge planning if the patient needs post-hospital services based on physician/advanced practitioner order or complex needs related to functional status, cognitive ability, or social support system  Outcome: Progressing     Problem: Knowledge Deficit  Goal: Patient/family/caregiver demonstrates understanding of disease process, treatment plan, medications, and discharge instructions  Description: Complete learning assessment and assess knowledge base.   Interventions:  - Provide teaching at level of understanding  - Provide teaching via preferred learning methods  Outcome: Progressing     Problem: NEUROSENSORY - ADULT  Goal: Achieves stable or improved neurological status  Description: INTERVENTIONS  - Monitor and report changes in neurological status  - Monitor vital signs such as temperature, blood pressure, glucose, and any other labs ordered   - Initiate measures to prevent increased intracranial pressure  - Monitor for seizure activity and implement precautions if appropriate      Outcome: Progressing  Goal: Remains free of injury related to seizures activity  Description: INTERVENTIONS  - Maintain airway, patient safety  and administer oxygen as ordered  - Monitor patient for seizure activity, document and report duration and description of seizure to physician/advanced practitioner  - If seizure occurs,  ensure patient safety during seizure  - Reorient patient post seizure  - Seizure pads on all 4 side rails  - Instruct patient/family to notify RN of any seizure activity including if an aura is experienced  - Instruct patient/family to call for assistance with activity based on nursing assessment  - Administer anti-seizure medications if ordered    Outcome: Progressing  Goal: Achieves maximal functionality and self care  Description: INTERVENTIONS  - Monitor swallowing and airway patency with patient fatigue and changes in neurological status  - Encourage and assist patient to increase activity and self care.    - Encourage visually impaired, hearing impaired and aphasic patients to use assistive/communication devices  Outcome: Progressing     Problem: MOBILITY - ADULT  Goal: Maintain or return to baseline ADL function  Description: INTERVENTIONS:  -  Assess patient's ability to carry out ADLs; assess patient's baseline for ADL function and identify physical deficits which impact ability to perform ADLs (bathing, care of mouth/teeth, toileting, grooming, dressing, etc.)  - Assess/evaluate cause of self-care deficits   - Assess range of motion  - Assess patient's mobility; develop plan if impaired  - Assess patient's need for assistive devices and provide as appropriate  - Encourage maximum independence but intervene and supervise when necessary  - Involve family in performance of ADLs  - Assess for home care needs following discharge   - Consider OT consult to assist with ADL evaluation and planning for discharge  - Provide patient education as appropriate  Outcome: Progressing  Goal: Maintains/Returns to pre admission functional level  Description: INTERVENTIONS:  - Perform BMAT or MOVE assessment daily.   - Set and communicate daily mobility goal to care team and patient/family/caregiver. - Collaborate with rehabilitation services on mobility goals if consulted  - Perform Range of Motion x times a day. - Reposition patient every x hours.   - Dangle patient x times a day  - Stand patient x times a day  - Ambulate patient x times a day  - Out of bed to chair x times a day   - Out of bed for meals xx times a day  - Out of bed for toileting  - Record patient progress and toleration of activity level   Outcome: Progressing

## 2023-07-10 NOTE — CONSULTS
Consultation - Neurology   Jessica Luna 47 y.o. male MRN: [id number]  Unit/Bed#: ED 02 Encounter: [id number]    Assessment/Plan    Stroke-like episode  Assessment & Plan  47year old male, who presents with horizontal diplopia, as well as dysarthria and a feeling of being pulled to the right. NIH of 1. The patient is not at tnk candidate, as he is out of the window secondary to time. The patient is not a thrombectomy candidate, no lvo seen or critical care stenosis. The patient was not a candidate for awake up stroke protocol with his low NIH.     - Stroke pathway, admit to medicine team  • MRI brain with and with out contrast, as he is young. • Echo with bubble study  • Lipid Panel  • Hemoglobin A1c  • TSH  • Ammonia  • Would check Urine toxicology, etoh screen  • Would check LFTs, hepatitis panel. • Load with Aspirin 325 mg once, Plavix 300 mg once, followed by Aspirin 81 mg and Plavix 75 mg, starting 7/11  • Atorvastatin 40 mg  • Permissive HTN, per protocol  • Continue telemetry  • PT/OT/ST  • Frequent neuro checks. Continue to monitor and notify neurology with any changes. - Medical management and supportive care per primary team. Correction of any metabolic or infectious disturbances.   -Examined alongside attending, please see attestation for details. Jessica Luna will need follow up in in 4 weeks with neurovascular attending or advance practitioner. He will not require outpatient neurological testing. History of Present Illness     Reason for Consult / Principal Problem:   Stroke alert.     HPI: Jessica Luna is a 47 y.o. right handed,  History of htn, insomnia, reports he was tired last night but no other symptoms, he was his normal state of health,he went to bed at around 10 p.m., he awoken this a..m at 7:30 he was off balance/slurred speech, when he was driving to work he noted horizontal diplopia which is improving, when he covered the other eye it improved, but worsened when the eye was not covered. This never happened before. It was worse at a distance, he could not tell if worse at one side or another. He reports his diplopia is improving. When he was walking following to the right. The patient did have vertigo. He awoken with this symptoms. The patient went to work and logged into a computer system, he could not log into the computer, he could not figure out his user name or pass word, he was unable to coordinate his fingers on the right at that time. He was having problems with right handed coordination. He was constantly stubbing on the right side. He felt as if he was drunk. He was able to walk to the car, he was brought to the ED by his wife whom picked him up. He does take ambien, on no opioids, amlodipine, lisinopril, and metoprolol. He takes Requip for RLS. CT of head, CTA of head and neck no lvo. He is not a awake up stroke candidate. The patient is not a tnk candidate, out of the window, not thrombectomy candidate. NIH is a 1. Consults    Review of Systems   He had no hiccups, no nausea or vomiting, but did have vertigo. No recent trauma to head or neck, he does strain his neck with lifting as he works at [employer]. Please see above, otherwise 12 point ROS are negative. Historical Information   Past Medical History:   Diagnosis Date   • Insomnia      Past Surgical History:   Procedure Laterality Date   • WISDOM TOOTH EXTRACTION       Social History   Social History     Substance and Sexual Activity   Alcohol Use Never     Social History     Substance and Sexual Activity   Drug Use No     E-Cigarette/Vaping   • E-Cigarette Use Former User      E-Cigarette/Vaping Substances     Social History     Tobacco Use   Smoking Status Never   Smokeless Tobacco Current   • Types: Chew     Family History: History reviewed. No pertinent family history.     Meds/Allergies   all current active meds have been reviewed, current meds:   Current Facility-Administered Medications   Medication Dose Route Frequency   • [START ON 7/11/2023] aspirin chewable tablet 81 mg  81 mg Oral Daily   • aspirin tablet 325 mg  325 mg Oral Once   • atorvastatin (LIPITOR) tablet 40 mg  40 mg Oral QPM   • clopidogrel (PLAVIX) tablet 300 mg  300 mg Oral Once   • nicotine (NICODERM CQ) 14 mg/24hr TD 24 hr patch 1 patch  1 patch Transdermal Daily    and PTA meds:   Prior to Admission Medications   Prescriptions Last Dose Informant Patient Reported? Taking? HYDROcodone-acetaminophen (NORCO) 5-325 mg per tablet   No No   Sig: Take 1 tablet by mouth every 4 (four) hours as needed for pain Max Daily Amount: 6 tablets   ibuprofen (MOTRIN) 600 mg tablet   No No   Sig: Take 1 tablet by mouth every 6 (six) hours as needed for mild pain or moderate pain   zolpidem (AMBIEN CR) 12.5 MG CR tablet   Yes No   Sig: Take 12.5 mg by mouth daily at bedtime as needed for sleep      Facility-Administered Medications: None       No Known Allergies    Objective   Vitals:Blood pressure 103/64, pulse 65, temperature 97.8 °F (36.6 °C), temperature source Temporal, resp. rate 18, SpO2 96 %. ,There is no height or weight on file to calculate BMI. No intake or output data in the 24 hours ending 07/10/23 1152    Invasive Devices: Invasive Devices     Peripheral Intravenous Line  Duration           Peripheral IV 07/10/23 Distal;Right;Upper;Ventral (anterior) Arm <1 day                Physical Exam  Vitals reviewed. Constitutional:       General: He is not in acute distress. Appearance: He is not ill-appearing, toxic-appearing or diaphoretic. HENT:      Head: Normocephalic and atraumatic. Nose: No congestion or rhinorrhea. Mouth/Throat:      Mouth: Mucous membranes are moist.   Eyes:      General:         Right eye: No discharge. Left eye: No discharge. Extraocular Movements: Extraocular movements intact. Pupils: Pupils are equal, round, and reactive to light. Cardiovascular:      Rate and Rhythm: Normal rate. Pulmonary:      Effort: No respiratory distress. Abdominal:      General: There is no distension. Musculoskeletal:         General: Normal range of motion. Cervical back: Normal range of motion. Skin:     General: Skin is warm. Neurological:      Mental Status: He is alert and oriented to person, place, and time. Cranial Nerves: Cranial nerves 2-12 are intact. Coordination: Finger-Nose-Finger Test, Heel to [employer] and Romberg Test normal.   Psychiatric:         Speech: Speech is slurred. Neurologic Exam     Mental Status   Oriented to person, place, and time. Attention: normal. Concentration: normal.   Speech: slurred   Level of consciousness: alert  Knowledge: good. Able to name object. Able to read. Able to repeat. He is awake and alert oriented x 3  He has dysarthria, no aphasia. He is able to tell me current events, he is a good historian. He is able to read and repeat, no aphasia. Cranial Nerves   Cranial nerves II through XII intact. CN III, IV, VI   Pupils are equal, round, and reactive to light. Except,   He does see diplopia with right gaze, next to each other (horizontal). Gaze is conjugate with no nystagmus, no DANTE. Pupils are equal and reactive. Mild right ptosis. Motor Exam     Strength   Strength 5/5 except as noted. 5/5 in the uppers and lowers  No fixed deficit noted. 5/5 in the lowers. Non focal there is no drift noted     Sensory Exam   Pinprick normal.   No neglect, no lateralizing features to pp. Gait, Coordination, and Reflexes     Coordination   Romberg: negative  Finger to nose coordination: normal  Heel to shin coordination: normal    Tremor   Resting tremor: absent  Intention tremor: absent    Reflexes   Right plantar: normal  Left plantar: normalNo tremor  No truncal ataxia  He is able to wake with out difficulty or lateralizing complaints.       NIH for 1, for dysarthria. Neurology arrival was immediate. Not a tnk candidate, or thrombectomy candidate as stated above. Lab Results:   I have personally reviewed pertinent reports. , CBC:   Results from last 7 days   Lab Units 07/10/23  1056   WBC Thousand/uL 7.53   RBC Million/uL 4.19   HEMOGLOBIN g/dL 12.1   HEMATOCRIT % 38.0   MCV fL 91   PLATELETS Thousands/uL 120*   , BMP/CMP:   Results from last 7 days   Lab Units 07/10/23  1056   SODIUM mmol/L 140   POTASSIUM mmol/L 4.0   CHLORIDE mmol/L 108   CO2 mmol/L 24   BUN mg/dL 11   CREATININE mg/dL 1.18   CALCIUM mg/dL 9.5   EGFR ml/min/1.73sq m 70   , Vitamin B12:   , HgBA1C:   , TSH:   , Coagulation:   Results from last 7 days   Lab Units 07/10/23  1056   INR  1.25*   , Lipid Profile:   , Ammonia:   , Urinalysis:       Invalid input(s): "URIBILINOGEN", Drug Screen:   , Medication Drug Levels:       Invalid input(s): "CARBAMAZEPINE", "LACOSAMIDE", "OXCARBAZEPINE"     Procedure: CTA stroke alert (head/neck)    Result Date: 7/10/2023  Narrative: CTA NECK AND BRAIN WITH CONTRAST INDICATION: Stroke Alert. Gait ataxia. Diplopia. Dysarthria. Right-sided ptosis. COMPARISON:   None. TECHNIQUE:   Post contrast imaging was performed after administration of iodinated contrast through the neck and brain. Post contrast axial 0.625 mm images timed to opacify the arterial system. 3D rendering was performed on an independent workstation. MIP reconstructions performed. Coronal reconstructions were performed of the noncontrast portion of the brain. Radiation dose length product (DLP) for this visit:  437.39 mGy-cm . This examination, like all CT scans performed in the [facility], was performed utilizing techniques to minimize radiation dose exposure, including the use of iterative  reconstruction and automated exposure control.  IV Contrast:  85 mL of iohexol (OMNIPAQUE) IMAGE QUALITY:   Diagnostic FINDINGS: CERVICAL VASCULATURE AORTIC ARCH AND GREAT VESSELS:  Normal aortic arch and great vessel origins. Normal visualized subclavian vessels. RIGHT VERTEBRAL ARTERY CERVICAL SEGMENT:  Normal origin. The vessel is normal in caliber throughout the neck. No dissection. LEFT VERTEBRAL ARTERY CERVICAL SEGMENT:  Normal origin. The vessel is normal in caliber throughout the neck. No dissection. RIGHT EXTRACRANIAL CAROTID SEGMENT:  Normal caliber common carotid artery. Normal bifurcation and cervical internal carotid artery. No stenosis or dissection. LEFT EXTRACRANIAL CAROTID SEGMENT:  Normal caliber common carotid artery. Normal bifurcation and cervical internal carotid artery. No stenosis or dissection. NASCET criteria was used to determine the degree of internal carotid artery diameter stenosis. INTRACRANIAL VASCULATURE INTERNAL CAROTID ARTERIES:  Normal enhancement of the intracranial portions of the internal carotid arteries. Normal ophthalmic artery origins. Normal ICA terminus. ANTERIOR CIRCULATION: Relatively hypoplastic right A1 anterior cerebral artery segment. Normal anterior communicating artery. Solitary patent large caliber proximal A2 segment consistent with anatomic variation. Distal anterior cerebral arteries are patent. MIDDLE CEREBRAL ARTERY CIRCULATION:  M1 segment and middle cerebral artery branches demonstrate normal enhancement bilaterally. DISTAL VERTEBRAL ARTERIES:  Normal distal vertebral arteries. Posterior inferior cerebellar artery origins are normal. Normal vertebral basilar junction. BASILAR ARTERY:  Basilar artery is normal in caliber. Normal superior cerebellar arteries. POSTERIOR CEREBRAL ARTERIES: Both posterior cerebral arteries arises from the basilar tip. Both arteries demonstrate normal enhancement. No vascular enhancement of either posterior communicating artery consistent with anatomic variation VENOUS STRUCTURES:  Normal. NON VASCULAR ANATOMY BONY STRUCTURES:  No acute osseous abnormality.  SOFT TISSUES OF THE NECK:  Normal. THORACIC INLET:  Unremarkable. Impression: No flow-limiting cerebrovascular stenosis or occlusion in the neck. No flow-limiting cerebrovascular stenosis or occlusion in the head. I reported these results to Yael Mckeon via HIPAA compliant secure electronic messaging on 7/10/2023 11:15 AM. Workstation performed: [id number]     Procedure: CT stroke alert brain    Result Date: 7/10/2023  Narrative: CT BRAIN - STROKE ALERT PROTOCOL INDICATION:   Stroke Alert. Gait ataxia. Diplopia. Dysarthria. Right-sided ptosis COMPARISON:  None. TECHNIQUE:  CT examination of the brain was performed. In addition to axial images, coronal reformatted images were created and submitted for interpretation. Radiation dose length product (DLP) for this visit:  895.11 mGy-cm . This examination, like all CT scans performed in the [facility], was performed utilizing techniques to minimize radiation dose exposure, including the use of iterative  reconstruction and automated exposure control. IMAGE QUALITY:  Diagnostic. FINDINGS: PARENCHYMA:  No intracranial mass, mass effect or midline shift. No CT signs of acute infarction. No acute parenchymal hemorrhage. Normal intracranial vasculature. VENTRICLES AND EXTRA-AXIAL SPACES:  Normal for the patient's age. VISUALIZED ORBITS: Normal visualized orbits. PARANASAL SINUSES: Minimal mucosal thickening in the left maxillary sinus. Retention cyst in the right sphenoid sinus. Sinuses and mastoid air cells are otherwise clear. CALVARIUM AND EXTRACRANIAL SOFT TISSUES:   Normal.     Impression: No acute intracranial abnormality. I reported these results to Yael Mckeon via HIPAA compliant secure electronic messaging on 7/10/2023 11:15 AM. Workstation performed: [id number]     Imaging Studies: I have personally reviewed pertinent reports. EKG, Pathology, and Other Studies: I have personally reviewed pertinent reports.       Code Status: Level 1 - Full Code    Counseling / Coordination of Care  Reviewed case with neurology attending, history and physical examination, labs and imaging completed, plan of care as per attending physician. Please see attestation for further details. Examined alongside attending physician. Please see attestation for details.

## 2023-07-11 ENCOUNTER — APPOINTMENT (INPATIENT)
Dept: NON INVASIVE DIAGNOSTICS | Facility: HOSPITAL | Age: 53
DRG: 065 | End: 2023-07-11
Payer: COMMERCIAL

## 2023-07-11 ENCOUNTER — APPOINTMENT (INPATIENT)
Dept: RADIOLOGY | Facility: HOSPITAL | Age: 53
DRG: 065 | End: 2023-07-11
Payer: COMMERCIAL

## 2023-07-11 ENCOUNTER — APPOINTMENT (INPATIENT)
Dept: ULTRASOUND IMAGING | Facility: HOSPITAL | Age: 53
DRG: 065 | End: 2023-07-11
Payer: COMMERCIAL

## 2023-07-11 PROBLEM — R74.01 TRANSAMINITIS: Status: ACTIVE | Noted: 2023-07-11

## 2023-07-11 PROBLEM — E83.42 HYPOMAGNESEMIA: Status: ACTIVE | Noted: 2023-07-11

## 2023-07-11 LAB
ALBUMIN SERPL BCP-MCNC: 3.9 G/DL (ref 3.5–5)
ALP SERPL-CCNC: 138 U/L (ref 34–104)
ALT SERPL W P-5'-P-CCNC: 21 U/L (ref 7–52)
AMPHETAMINES UR QL SCN: NEGATIVE NG/ML
ANION GAP SERPL CALCULATED.3IONS-SCNC: 9 MMOL/L
AORTIC ROOT: 3.4 CM
APICAL FOUR CHAMBER EJECTION FRACTION: 63 %
ASCENDING AORTA: 3.2 CM
AST SERPL W P-5'-P-CCNC: 42 U/L (ref 13–39)
ATRIAL RATE: 67 BPM
BARBITURATES UR QL SCN: NEGATIVE NG/ML
BASOPHILS # BLD AUTO: 0.03 THOUSANDS/ÂΜL (ref 0–0.1)
BASOPHILS NFR BLD AUTO: 0 % (ref 0–1)
BENZODIAZ UR QL: NEGATIVE NG/ML
BILIRUB SERPL-MCNC: 2.23 MG/DL (ref 0.2–1)
BUN SERPL-MCNC: 14 MG/DL (ref 5–25)
BZE UR QL: NEGATIVE NG/ML
CALCIUM SERPL-MCNC: 9.2 MG/DL (ref 8.4–10.2)
CANNABINOIDS UR QL SCN: NEGATIVE NG/ML
CHLORIDE SERPL-SCNC: 103 MMOL/L (ref 96–108)
CO2 SERPL-SCNC: 25 MMOL/L (ref 21–32)
CREAT SERPL-MCNC: 1.12 MG/DL (ref 0.6–1.3)
E WAVE DECELERATION TIME: 177 MS
EOSINOPHIL # BLD AUTO: 0.17 THOUSAND/ÂΜL (ref 0–0.61)
EOSINOPHIL NFR BLD AUTO: 2 % (ref 0–6)
ERYTHROCYTE [DISTWIDTH] IN BLOOD BY AUTOMATED COUNT: 14.1 % (ref 11.6–15.1)
FRACTIONAL SHORTENING: 33 (ref 28–44)
GFR SERPL CREATININE-BSD FRML MDRD: 75 ML/MIN/1.73SQ M
GLUCOSE SERPL-MCNC: 103 MG/DL (ref 65–140)
GLUCOSE SERPL-MCNC: 166 MG/DL (ref 65–140)
HCT VFR BLD AUTO: 37.1 % (ref 36.5–49.3)
HGB BLD-MCNC: 12 G/DL (ref 12–17)
HIV 1+2 AB+HIV1 P24 AG SERPL QL IA: NORMAL
HIV 2 AB SERPL QL IA: NORMAL
HIV1 AB SERPL QL IA: NORMAL
HIV1 P24 AG SERPL QL IA: NORMAL
IMM GRANULOCYTES # BLD AUTO: 0.01 THOUSAND/UL (ref 0–0.2)
IMM GRANULOCYTES NFR BLD AUTO: 0 % (ref 0–2)
INR PPP: 1.43 (ref 0.84–1.19)
INTERVENTRICULAR SEPTUM IN DIASTOLE (PARASTERNAL SHORT AXIS VIEW): 1 CM
INTERVENTRICULAR SEPTUM: 1 CM (ref 0.6–1.1)
LAAS-AP2: 16.7 CM2
LAAS-AP4: 22.3 CM2
LEFT ATRIUM SIZE: 4.1 CM
LEFT ATRIUM VOLUME (MOD BIPLANE): 62 ML
LEFT INTERNAL DIMENSION IN SYSTOLE: 3.4 CM (ref 2.1–4)
LEFT VENTRICLE DIASTOLIC VOLUME (MOD BIPLANE): 199 ML
LEFT VENTRICLE SYSTOLIC VOLUME (MOD BIPLANE): 70 ML
LEFT VENTRICULAR INTERNAL DIMENSION IN DIASTOLE: 5.1 CM (ref 3.5–6)
LEFT VENTRICULAR POSTERIOR WALL IN END DIASTOLE: 1 CM
LEFT VENTRICULAR STROKE VOLUME: 80 ML
LV EF: 65 %
LVSV (TEICH): 80 ML
LYMPHOCYTES # BLD AUTO: 1.24 THOUSANDS/ÂΜL (ref 0.6–4.47)
LYMPHOCYTES NFR BLD AUTO: 17 % (ref 14–44)
MAGNESIUM SERPL-MCNC: 1.5 MG/DL (ref 1.9–2.7)
MCH RBC QN AUTO: 29 PG (ref 26.8–34.3)
MCHC RBC AUTO-ENTMCNC: 32.3 G/DL (ref 31.4–37.4)
MCV RBC AUTO: 90 FL (ref 82–98)
METHADONE UR QL SCN: NEGATIVE NG/ML
MONOCYTES # BLD AUTO: 0.38 THOUSAND/ÂΜL (ref 0.17–1.22)
MONOCYTES NFR BLD AUTO: 5 % (ref 4–12)
MV E'TISSUE VEL-SEP: 15 CM/S
MV PEAK A VEL: 0.93 M/S
MV PEAK E VEL: 96 CM/S
MV STENOSIS PRESSURE HALF TIME: 51 MS
MV VALVE AREA P 1/2 METHOD: 4.31
NEUTROPHILS # BLD AUTO: 5.55 THOUSANDS/ÂΜL (ref 1.85–7.62)
NEUTS SEG NFR BLD AUTO: 75 % (ref 43–75)
NRBC BLD AUTO-RTO: 0 /100 WBCS
OPIATES UR QL: NEGATIVE NG/ML
P AXIS: 58 DEGREES
PCP UR QL: NEGATIVE NG/ML
PLATELET # BLD AUTO: 90 THOUSANDS/UL (ref 149–390)
PMV BLD AUTO: 10.8 FL (ref 8.9–12.7)
POTASSIUM SERPL-SCNC: 3.7 MMOL/L (ref 3.5–5.3)
PR INTERVAL: 148 MS
PROPOXYPH UR QL SCN: NEGATIVE NG/ML
PROT SERPL-MCNC: 7 G/DL (ref 6.4–8.4)
PROTHROMBIN TIME: 18.4 SECONDS (ref 11.6–14.5)
QRS AXIS: 51 DEGREES
QRSD INTERVAL: 86 MS
QT INTERVAL: 428 MS
QTC INTERVAL: 452 MS
RBC # BLD AUTO: 4.14 MILLION/UL (ref 3.88–5.62)
RIGHT ATRIUM AREA SYSTOLE A4C: 21.7 CM2
RIGHT VENTRICLE ID DIMENSION: 4.7 CM
SL CV LEFT ATRIUM LENGTH A2C: 4.7 CM
SL CV LV EF: 65
SL CV PED ECHO LEFT VENTRICLE DIASTOLIC VOLUME (MOD BIPLANE) 2D: 126 ML
SL CV PED ECHO LEFT VENTRICLE SYSTOLIC VOLUME (MOD BIPLANE) 2D: 46 ML
SODIUM SERPL-SCNC: 137 MMOL/L (ref 135–147)
T WAVE AXIS: 64 DEGREES
TR MAX PG: 20 MMHG
TR PEAK VELOCITY: 2.2 M/S
TRICUSPID ANNULAR PLANE SYSTOLIC EXCURSION: 2.8 CM
TRICUSPID VALVE PEAK REGURGITATION VELOCITY: 2.23 M/S
TSH SERPL DL<=0.05 MIU/L-ACNC: 2.05 UIU/ML (ref 0.45–4.5)
VENTRICULAR RATE: 67 BPM
WBC # BLD AUTO: 7.38 THOUSAND/UL (ref 4.31–10.16)

## 2023-07-11 PROCEDURE — 85025 COMPLETE CBC W/AUTO DIFF WBC: CPT | Performed by: PHYSICIAN ASSISTANT

## 2023-07-11 PROCEDURE — 82948 REAGENT STRIP/BLOOD GLUCOSE: CPT

## 2023-07-11 PROCEDURE — 99232 SBSQ HOSP IP/OBS MODERATE 35: CPT | Performed by: PHYSICIAN ASSISTANT

## 2023-07-11 PROCEDURE — 93306 TTE W/DOPPLER COMPLETE: CPT

## 2023-07-11 PROCEDURE — 99253 IP/OBS CNSLTJ NEW/EST LOW 45: CPT | Performed by: INTERNAL MEDICINE

## 2023-07-11 PROCEDURE — 83735 ASSAY OF MAGNESIUM: CPT | Performed by: PHYSICIAN ASSISTANT

## 2023-07-11 PROCEDURE — 92526 ORAL FUNCTION THERAPY: CPT

## 2023-07-11 PROCEDURE — 84443 ASSAY THYROID STIM HORMONE: CPT | Performed by: STUDENT IN AN ORGANIZED HEALTH CARE EDUCATION/TRAINING PROGRAM

## 2023-07-11 PROCEDURE — 99232 SBSQ HOSP IP/OBS MODERATE 35: CPT | Performed by: STUDENT IN AN ORGANIZED HEALTH CARE EDUCATION/TRAINING PROGRAM

## 2023-07-11 PROCEDURE — 76705 ECHO EXAM OF ABDOMEN: CPT

## 2023-07-11 PROCEDURE — 93306 TTE W/DOPPLER COMPLETE: CPT | Performed by: INTERNAL MEDICINE

## 2023-07-11 PROCEDURE — 80053 COMPREHEN METABOLIC PANEL: CPT | Performed by: PHYSICIAN ASSISTANT

## 2023-07-11 PROCEDURE — 85610 PROTHROMBIN TIME: CPT | Performed by: PHYSICIAN ASSISTANT

## 2023-07-11 PROCEDURE — 71045 X-RAY EXAM CHEST 1 VIEW: CPT

## 2023-07-11 PROCEDURE — 93010 ELECTROCARDIOGRAM REPORT: CPT | Performed by: INTERNAL MEDICINE

## 2023-07-11 RX ORDER — MAGNESIUM SULFATE HEPTAHYDRATE 40 MG/ML
2 INJECTION, SOLUTION INTRAVENOUS ONCE
Status: COMPLETED | OUTPATIENT
Start: 2023-07-11 | End: 2023-07-11

## 2023-07-11 RX ORDER — ATORVASTATIN CALCIUM 10 MG/1
10 TABLET, FILM COATED ORAL EVERY EVENING
Status: DISCONTINUED | OUTPATIENT
Start: 2023-07-11 | End: 2023-07-13 | Stop reason: HOSPADM

## 2023-07-11 RX ORDER — ENOXAPARIN SODIUM 100 MG/ML
40 INJECTION SUBCUTANEOUS
Status: DISCONTINUED | OUTPATIENT
Start: 2023-07-11 | End: 2023-07-13 | Stop reason: HOSPADM

## 2023-07-11 RX ORDER — CLOPIDOGREL BISULFATE 75 MG/1
75 TABLET ORAL DAILY
Status: DISCONTINUED | OUTPATIENT
Start: 2023-07-11 | End: 2023-07-13 | Stop reason: HOSPADM

## 2023-07-11 RX ADMIN — MAGNESIUM SULFATE HEPTAHYDRATE 2 G: 2 INJECTION, SOLUTION INTRAVENOUS at 11:06

## 2023-07-11 RX ADMIN — ACETAMINOPHEN 650 MG: 325 TABLET, FILM COATED ORAL at 08:30

## 2023-07-11 RX ADMIN — ATORVASTATIN CALCIUM 10 MG: 10 TABLET, FILM COATED ORAL at 17:12

## 2023-07-11 RX ADMIN — ENOXAPARIN SODIUM 40 MG: 100 INJECTION SUBCUTANEOUS at 13:24

## 2023-07-11 RX ADMIN — ACETAMINOPHEN 650 MG: 325 TABLET, FILM COATED ORAL at 21:04

## 2023-07-11 RX ADMIN — ASPIRIN 81 MG CHEWABLE TABLET 81 MG: 81 TABLET CHEWABLE at 08:29

## 2023-07-11 RX ADMIN — CLOPIDOGREL BISULFATE 75 MG: 75 TABLET ORAL at 08:29

## 2023-07-11 RX ADMIN — NICOTINE 1 PATCH: 14 PATCH, EXTENDED RELEASE TRANSDERMAL at 08:30

## 2023-07-11 RX ADMIN — ZOLPIDEM TARTRATE 10 MG: 5 TABLET ORAL at 22:07

## 2023-07-11 RX ADMIN — ROPINIROLE HYDROCHLORIDE 0.5 MG: 0.25 TABLET, FILM COATED ORAL at 22:07

## 2023-07-11 NOTE — SPEECH THERAPY NOTE
Speech Language/Pathology    Speech/Language Pathology Progress Note    Patient Name: Gonzalo Sheriff  GTPEG'B Date: 7/11/2023     Problem List  Principal Problem:    Stroke-like episode       Past Medical History  Past Medical History:   Diagnosis Date   • Insomnia         Past Surgical History  Past Surgical History:   Procedure Laterality Date   • WISDOM TOOTH EXTRACTION           Subjective:  Pt awake and alert. "I'm feeling so much better"     Current Diet:  Regular/thin     Objective:  Pt seen for dx dysphagia tx. Speech is notably improved, per pt is at baseline. No gross speech/language deficits observed, further assessment not warranted. Pt does endorse ongoing reflux, does not feel it is well managed at this time. Pt assessed w/ regular texture snacks at bedside. Adequate bite strength for all. Timely and effective mastication. Rapid consecutive sips of thin taken via straw without difficulty. No overt s/s aspiration directly timed w/ swallows. Dry cough noted several minutes after completion of PO. Pt reports reflux at this time, states he often has delayed cough response associated w/ reflux after eating. Assessment:  Oropharyngeal swallow skill appears WFL. No ongoing concerns for speech/language deficits. Pt does appear to be having symptoms of reflux, may benefit from GI f/u.      Plan/Recommendations:  Continue current diet  Frequent/thorough oral care  No further ST needs, please re-consult if medically necessary

## 2023-07-11 NOTE — PLAN OF CARE
Problem: Potential for Falls  Goal: Patient will remain free of falls  Description: INTERVENTIONS:  - Educate patient/family on patient safety including physical limitations  - Instruct patient to call for assistance with activity   - Consult OT/PT to assist with strengthening/mobility   - Keep Call bell within reach  - Keep bed low and locked with side rails adjusted as appropriate  - Keep care items and personal belongings within reach  - Initiate and maintain comfort rounds  - Make Fall Risk Sign visible to staff  - Offer Toileting every  Hours, in advance of need  - Initiate/Maintain alarm  - Obtain necessary fall risk management equipment:   - Apply yellow socks and bracelet for high fall risk patients  - Consider moving patient to room near nurses station  Outcome: Progressing     Problem: PAIN - ADULT  Goal: Verbalizes/displays adequate comfort level or baseline comfort level  Description: Interventions:  - Encourage patient to monitor pain and request assistance  - Assess pain using appropriate pain scale  - Administer analgesics based on type and severity of pain and evaluate response  - Implement non-pharmacological measures as appropriate and evaluate response  - Consider cultural and social influences on pain and pain management  - Notify physician/advanced practitioner if interventions unsuccessful or patient reports new pain  Outcome: Progressing     Problem: INFECTION - ADULT  Goal: Absence or prevention of progression during hospitalization  Description: INTERVENTIONS:  - Assess and monitor for signs and symptoms of infection  - Monitor lab/diagnostic results  - Monitor all insertion sites, i.e. indwelling lines, tubes, and drains  - Monitor endotracheal if appropriate and nasal secretions for changes in amount and color  - [place] appropriate cooling/warming therapies per order  - Administer medications as ordered  - Instruct and encourage patient and family to use good hand hygiene technique  - Identify and instruct in appropriate isolation precautions for identified infection/condition  Outcome: Progressing  Goal: Absence of fever/infection during neutropenic period  Description: INTERVENTIONS:  - Monitor WBC    Outcome: Progressing     Problem: SAFETY ADULT  Goal: Patient will remain free of falls  Description: INTERVENTIONS:  - Educate patient/family on patient safety including physical limitations  - Instruct patient to call for assistance with activity   - Consult OT/PT to assist with strengthening/mobility   - Keep Call bell within reach  - Keep bed low and locked with side rails adjusted as appropriate  - Keep care items and personal belongings within reach  - Initiate and maintain comfort rounds  - Make Fall Risk Sign visible to staff  - Offer Toileting every  Hours, in advance of need  - Initiate/Maintain alarm  - Obtain necessary fall risk management equipment:   - Apply yellow socks and bracelet for high fall risk patients  - Consider moving patient to room near nurses station  Outcome: Progressing  Goal: Maintain or return to baseline ADL function  Description: INTERVENTIONS:  -  Assess patient's ability to carry out ADLs; assess patient's baseline for ADL function and identify physical deficits which impact ability to perform ADLs (bathing, care of mouth/teeth, toileting, grooming, dressing, etc.)  - Assess/evaluate cause of self-care deficits   - Assess range of motion  - Assess patient's mobility; develop plan if impaired  - Assess patient's need for assistive devices and provide as appropriate  - Encourage maximum independence but intervene and supervise when necessary  - Involve family in performance of ADLs  - Assess for home care needs following discharge   - Consider OT consult to assist with ADL evaluation and planning for discharge  - Provide patient education as appropriate  Outcome: Progressing  Goal: Maintains/Returns to pre admission functional level  Description: INTERVENTIONS:  - Perform BMAT or MOVE assessment daily.   - Set and communicate daily mobility goal to care team and patient/family/caregiver. - Collaborate with rehabilitation services on mobility goals if consulted  - Perform Range of Motion  times a day. - Reposition patient every  hours. - Dangle patient  times a day  - Stand patient  times a day  - Ambulate patient  times a day  - Out of bed to chair  times a day   - Out of bed for meals  times a day  - Out of bed for toileting  - Record patient progress and toleration of activity level   Outcome: Progressing     Problem: DISCHARGE PLANNING  Goal: Discharge to home or other facility with appropriate resources  Description: INTERVENTIONS:  - Identify barriers to discharge w/patient and caregiver  - Arrange for needed discharge resources and transportation as appropriate  - Identify discharge learning needs (meds, wound care, etc.)  - Arrange for interpretive services to assist at discharge as needed  - Refer to Case Management Department for coordinating discharge planning if the patient needs post-hospital services based on physician/advanced practitioner order or complex needs related to functional status, cognitive ability, or social support system  Outcome: Progressing     Problem: Knowledge Deficit  Goal: Patient/family/caregiver demonstrates understanding of disease process, treatment plan, medications, and discharge instructions  Description: Complete learning assessment and assess knowledge base.   Interventions:  - Provide teaching at level of understanding  - Provide teaching via preferred learning methods  Outcome: Progressing     Problem: NEUROSENSORY - ADULT  Goal: Achieves stable or improved neurological status  Description: INTERVENTIONS  - Monitor and report changes in neurological status  - Monitor vital signs such as temperature, blood pressure, glucose, and any other labs ordered   - Initiate measures to prevent increased intracranial pressure  - Monitor for seizure activity and implement precautions if appropriate      Outcome: Progressing  Goal: Remains free of injury related to seizures activity  Description: INTERVENTIONS  - Maintain airway, patient safety  and administer oxygen as ordered  - Monitor patient for seizure activity, document and report duration and description of seizure to physician/advanced practitioner  - If seizure occurs,  ensure patient safety during seizure  - Reorient patient post seizure  - Seizure pads on all 4 side rails  - Instruct patient/family to notify RN of any seizure activity including if an aura is experienced  - Instruct patient/family to call for assistance with activity based on nursing assessment  - Administer anti-seizure medications if ordered    Outcome: Progressing  Goal: Achieves maximal functionality and self care  Description: INTERVENTIONS  - Monitor swallowing and airway patency with patient fatigue and changes in neurological status  - Encourage and assist patient to increase activity and self care.    - Encourage visually impaired, hearing impaired and aphasic patients to use assistive/communication devices  Outcome: Progressing     Problem: MOBILITY - ADULT  Goal: Maintain or return to baseline ADL function  Description: INTERVENTIONS:  -  Assess patient's ability to carry out ADLs; assess patient's baseline for ADL function and identify physical deficits which impact ability to perform ADLs (bathing, care of mouth/teeth, toileting, grooming, dressing, etc.)  - Assess/evaluate cause of self-care deficits   - Assess range of motion  - Assess patient's mobility; develop plan if impaired  - Assess patient's need for assistive devices and provide as appropriate  - Encourage maximum independence but intervene and supervise when necessary  - Involve family in performance of ADLs  - Assess for home care needs following discharge   - Consider OT consult to assist with ADL evaluation and planning for discharge  - Provide patient education as appropriate  Outcome: Progressing  Goal: Maintains/Returns to pre admission functional level  Description: INTERVENTIONS:  - Perform BMAT or MOVE assessment daily.   - Set and communicate daily mobility goal to care team and patient/family/caregiver. - Collaborate with rehabilitation services on mobility goals if consulted  - Perform Range of Motion  times a day. - Reposition patient every  hours.   - Dangle patient  times a day  - Stand patient  times a day  - Ambulate patient  times a day  - Out of bed to chair  times a day   - Out of bed for meals  times a day  - Out of bed for toileting  - Record patient progress and toleration of activity level   Outcome: Progressing
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
## 2023-07-11 NOTE — CASE MANAGEMENT
Case Management Assessment & Discharge Planning Note    Patient name Alcira Garcia  Location [address] [facility] 333/-85 MRN [id number]  : 1970 Date 2023       Current Admission Date: 7/10/2023  Current Admission Diagnosis:Stroke-like episode   Patient Active Problem List    Diagnosis Date Noted   • Transaminitis 2023   • Hypomagnesemia 2023   • Stroke-like episode 07/10/2023      LOS (days): 1  Geometric Mean LOS (GMLOS) (days):   Days to GMLOS:     OBJECTIVE:    Risk of Unplanned Readmission Score: 7.14      Current admission status: Inpatient    Preferred Pharmacy:   [id number], [address]  Phone: [phone] Fax: [phone]    Primary Care Provider: Rajani Lu DO    Primary Insurance: [employer]  Secondary Insurance:     ASSESSMENT:  [address] [facility], [address] - Spouse   Primary Phone: [phone] (Mobile)               Advance Directives  Does patient have a [address]?: No  Was patient offered paperwork?: Yes  Does patient currently have a Health Care decision maker?: Yes, please see Health Care Proxy section  Does patient have Advance Directives?: No  Was patient offered paperwork?: Yes  Primary Contact: Anjana Ibrahim    Readmission Root Cause  30 Day Readmission: No    Patient Information  Admitted from[de-identified] Home  Mental Status: Alert  During Assessment patient was accompanied by: Not accompanied during assessment  Assessment information provided by[de-identified] Patient  Primary Caregiver: Self  Support Systems: Family members  [place] [facility]: [address] [place] [address] do you live in?: [address] [place] [address] entry access options.  Select all that apply.: Stairs  Number of steps to enter home.: 6  Type of Current Residence: 2 story home  Upon entering residence, is there a bedroom on the main floor (no further steps)?: Yes  Upon entering residence, is there a bathroom on the main floor (no further steps)?: Yes  In the last 12 months, was there a time when you were not able to pay the mortgage or rent on time?: No  In the last 12 months, how many places have you lived?: 1  In the last 12 months, was there a time when you did not have a steady place to sleep or slept in a shelter (including now)?: No  Homeless/housing insecurity resource given?: N/A  Living Arrangements: Lives w/ Parent(s)  Is patient a ?: No    Activities of Daily Living Prior to Admission  Functional Status: Independent  Completes ADLs independently?: Yes  Ambulates independently?: Yes  Does patient use assisted devices?: No  Does patient currently own DME?: No  Does patient have a history of Outpatient Therapy (PT/OT)?: No  Does the patient have a history of Short-Term Rehab?: No  Does patient have a history of HHC?: No  Does patient currently have [address]?: No         Patient Information Continued  Does patient have prescription coverage?: Yes  Within the past 12 months, you worried that your food would run out before you got the money to buy more.: Never true  Within the past 12 months, the food you bought just didn't last and you didn't have money to get more.: Never true  Food insecurity resource given?: N/A  Does patient receive dialysis treatments?: No  Does patient have a history of substance abuse?: Yes  Historical substance use preference: Other, Alcohol/ETOH (Opiates)  Is patient currently in treatment for substance abuse?: No. Patient declined treatment information.   Does patient have a history of Mental Health Diagnosis?: Yes  Is patient receiving treatment for mental health?: Yes  Has patient received inpatient treatment related to mental health in the last 2 years?: No    Means of Transportation  Means of Transport to Appts[de-identified] Drives Self  In the past 12 months, has lack of transportation kept you from medical appointments or from getting medications?: No  In the past 12 months, has lack of transportation kept you from meetings, work, or from getting things needed for daily living?: No  Was application for public transport provided?: N/A    DISCHARGE DETAILS:    Discharge planning discussed with[de-identified] pt  Freedom of Choice: Yes     CM contacted family/caregiver?: No- see comments  Were Treatment Team discharge recommendations reviewed with patient/caregiver?: Yes  Did patient/caregiver verbalize understanding of patient care needs?: Yes  Were patient/caregiver advised of the risks associated with not following Treatment Team discharge recommendations?: Yes    Requested [address]         Is the patient interested in [facility] at discharge?: No    DME Referral Provided  Referral made for DME?: No    Other Referral/Resources/Interventions Provided:  Interventions: None Indicated    Treatment Team Recommendation: Home  Discharge Destination Plan[de-identified] Home     Additional Comments: Cm met with pt and reviewed cm role. Pt lives with his 15 yrs old dtr. He has a first floor set up with 6 IZAIAH. He is ind at baseline and in room. He uses no DME. He has had no outpt PT, no VNA, no STR. He has depression managed by meds by his PCP and therapy at [facility]. 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 He has had some ETOH but wants to stop on his own due to his recent medical issues. He drives himself and works at [employer]. His family will transport home. He uses [facility] [employer].

## 2023-07-11 NOTE — OCCUPATIONAL THERAPY NOTE
Occupational Therapy Screen    Patient Name: Aron Hi  [id number] Date: 7/11/2023 07/11/23 1224   OT Last Visit   OT Visit Date 07/11/23   Note Type   Note type Screen   Additional Comments OT orders received and chart reviewed. Spoke to pt who states pt is currently independent in room; getting to and from bathroom. Stroke symptoms have resolved. Recommend pt continue to be OOB for meals, ambulation to/from BR, perform self care tasks, and mobility in hallway with nursing. At this time, OT recommendations at time of discharge are return home at prior level of function. No acute OT needs identified at this time; please re-consult if OT needs arise during remainder of hospital stay.      [employer], MS, OTR/L Hemostasis: Ferric chloride Curettage Text: The wound bed was treated with curettage after the biopsy was performed. Biopsy Method: Dermablade Electrodesiccation And Curettage Text: The wound bed was treated with electrodesiccation and curettage after the biopsy was performed. Silver Nitrate Text: The wound bed was treated with silver nitrate after the biopsy was performed. Billing Type: Third-Party Bill Consent: Written consent was obtained and risks were reviewed including but not limited to scarring, infection, bleeding, scabbing, incomplete removal, nerve damage and allergy to anesthesia. Post-Care Instructions: I reviewed with the patient in detail post-care instructions. Patient is to keep the biopsy site dry overnight, and then apply bacitracin twice daily until healed. Patient may apply hydrogen peroxide soaks to remove any crusting. Additional Anesthesia Volume In Cc (Will Not Render If 0): 0 Anesthesia Volume In Cc: 0.5 Bill For Surgical Tray: no Detail Level: Detailed Type Of Destruction Used: Curettage Wound Care: Polysporin ointment Notification Instructions: Patient will be notified of biopsy results. However, patient instructed to call the office if not contacted within 2 weeks. Biopsy Type: H and E Anesthesia Type: 1% lidocaine with epinephrine and a 1:10 solution of 8.4% sodium bicarbonate Cryotherapy Text: The wound bed was treated with cryotherapy after the biopsy was performed. Dressing: bandage Electrodesiccation Text: The wound bed was treated with electrodesiccation after the biopsy was performed.

## 2023-07-11 NOTE — PLAN OF CARE
Problem: Potential for Falls  Goal: Patient will remain free of falls  Description: INTERVENTIONS:  - Educate patient/family on patient safety including physical limitations  - Instruct patient to call for assistance with activity   - Consult OT/PT to assist with strengthening/mobility   - Keep Call bell within reach  - Keep bed low and locked with side rails adjusted as appropriate  - Keep care items and personal belongings within reach  - Initiate and maintain comfort rounds  - Make Fall Risk Sign visible to staff  - Offer Toileting every  Hours, in advance of need  - Initiate/Maintain alarm  - Obtain necessary fall risk management equipment:   - Apply yellow socks and bracelet for high fall risk patients  - Consider moving patient to room near nurses station  7/11/2023 0935 by Nanda Castro RN  Outcome: Progressing  7/11/2023 0759 by Nanda Castro RN  Outcome: Progressing     Problem: PAIN - ADULT  Goal: Verbalizes/displays adequate comfort level or baseline comfort level  Description: Interventions:  - Encourage patient to monitor pain and request assistance  - Assess pain using appropriate pain scale  - Administer analgesics based on type and severity of pain and evaluate response  - Implement non-pharmacological measures as appropriate and evaluate response  - Consider cultural and social influences on pain and pain management  - Notify physician/advanced practitioner if interventions unsuccessful or patient reports new pain  7/11/2023 0935 by Nanda Castro RN  Outcome: Progressing  7/11/2023 0759 by Nanda Castro RN  Outcome: Progressing     Problem: INFECTION - ADULT  Goal: Absence or prevention of progression during hospitalization  Description: INTERVENTIONS:  - Assess and monitor for signs and symptoms of infection  - Monitor lab/diagnostic results  - Monitor all insertion sites, i.e. indwelling lines, tubes, and drains  - Monitor endotracheal if appropriate and nasal secretions for changes in amount and color  - [place] appropriate cooling/warming therapies per order  - Administer medications as ordered  - Instruct and encourage patient and family to use good hand hygiene technique  - Identify and instruct in appropriate isolation precautions for identified infection/condition  7/11/2023 0935 by Palmira Bledsoe RN  Outcome: Progressing  7/11/2023 0759 by Palmira Bledsoe RN  Outcome: Progressing  Goal: Absence of fever/infection during neutropenic period  Description: INTERVENTIONS:  - Monitor WBC    7/11/2023 0935 by Palmira Bledsoe RN  Outcome: Progressing  7/11/2023 0759 by Palmira Bledsoe RN  Outcome: Progressing     Problem: SAFETY ADULT  Goal: Patient will remain free of falls  Description: INTERVENTIONS:  - Educate patient/family on patient safety including physical limitations  - Instruct patient to call for assistance with activity   - Consult OT/PT to assist with strengthening/mobility   - Keep Call bell within reach  - Keep bed low and locked with side rails adjusted as appropriate  - Keep care items and personal belongings within reach  - Initiate and maintain comfort rounds  - Make Fall Risk Sign visible to staff  - Offer Toileting every  Hours, in advance of need  - Initiate/Maintain alarm  - Obtain necessary fall risk management equipment:   - Apply yellow socks and bracelet for high fall risk patients  - Consider moving patient to room near nurses station  7/11/2023 0935 by Palmira Bledsoe RN  Outcome: Progressing  7/11/2023 0759 by Palmira Bledsoe RN  Outcome: Progressing  Goal: Maintain or return to baseline ADL function  Description: INTERVENTIONS:  -  Assess patient's ability to carry out ADLs; assess patient's baseline for ADL function and identify physical deficits which impact ability to perform ADLs (bathing, care of mouth/teeth, toileting, grooming, dressing, etc.)  - Assess/evaluate cause of self-care deficits   - Assess range of motion  - Assess patient's mobility; develop plan if impaired  - Assess patient's need for assistive devices and provide as appropriate  - Encourage maximum independence but intervene and supervise when necessary  - Involve family in performance of ADLs  - Assess for home care needs following discharge   - Consider OT consult to assist with ADL evaluation and planning for discharge  - Provide patient education as appropriate  7/11/2023 0935 by Jass Goss RN  Outcome: Progressing  7/11/2023 0759 by Jass Goss RN  Outcome: Progressing  Goal: Maintains/Returns to pre admission functional level  Description: INTERVENTIONS:  - Perform BMAT or MOVE assessment daily.   - Set and communicate daily mobility goal to care team and patient/family/caregiver. - Collaborate with rehabilitation services on mobility goals if consulted  - Perform Range of Motion  times a day. - Reposition patient every  hours.   - Dangle patient  times a day  - Stand patient  times a day  - Ambulate patient  times a day  - Out of bed to chair  times a day   - Out of bed for meals  times a day  - Out of bed for toileting  - Record patient progress and toleration of activity level   7/11/2023 0935 by Jass Goss RN  Outcome: Progressing  7/11/2023 0759 by Jass Goss RN  Outcome: Progressing     Problem: DISCHARGE PLANNING  Goal: Discharge to home or other facility with appropriate resources  Description: INTERVENTIONS:  - Identify barriers to discharge w/patient and caregiver  - Arrange for needed discharge resources and transportation as appropriate  - Identify discharge learning needs (meds, wound care, etc.)  - Arrange for interpretive services to assist at discharge as needed  - Refer to Case Management Department for coordinating discharge planning if the patient needs post-hospital services based on physician/advanced practitioner order or complex needs related to functional status, cognitive ability, or social support system  7/11/2023 0935 by Jass Goss RN  Outcome: Progressing  7/11/2023 6229 by Nathaniel Valenzuela RN  Outcome: Progressing     Problem: Knowledge Deficit  Goal: Patient/family/caregiver demonstrates understanding of disease process, treatment plan, medications, and discharge instructions  Description: Complete learning assessment and assess knowledge base.   Interventions:  - Provide teaching at level of understanding  - Provide teaching via preferred learning methods  7/11/2023 0935 by Nathaniel Valenzuela RN  Outcome: Progressing  7/11/2023 0759 by Nathaniel Valenzuela RN  Outcome: Progressing     Problem: NEUROSENSORY - ADULT  Goal: Achieves stable or improved neurological status  Description: INTERVENTIONS  - Monitor and report changes in neurological status  - Monitor vital signs such as temperature, blood pressure, glucose, and any other labs ordered   - Initiate measures to prevent increased intracranial pressure  - Monitor for seizure activity and implement precautions if appropriate      7/11/2023 0935 by Nathaniel Valenzuela RN  Outcome: Progressing  7/11/2023 0759 by Nathaniel Valenzuela RN  Outcome: Progressing  Goal: Remains free of injury related to seizures activity  Description: INTERVENTIONS  - Maintain airway, patient safety  and administer oxygen as ordered  - Monitor patient for seizure activity, document and report duration and description of seizure to physician/advanced practitioner  - If seizure occurs,  ensure patient safety during seizure  - Reorient patient post seizure  - Seizure pads on all 4 side rails  - Instruct patient/family to notify RN of any seizure activity including if an aura is experienced  - Instruct patient/family to call for assistance with activity based on nursing assessment  - Administer anti-seizure medications if ordered    7/11/2023 0935 by Nathaniel Valenzuela RN  Outcome: Progressing  7/11/2023 0759 by Nathaniel Valenzuela RN  Outcome: Progressing  Goal: Achieves maximal functionality and self care  Description: INTERVENTIONS  - Monitor swallowing and airway patency with patient fatigue and changes in neurological status  - Encourage and assist patient to increase activity and self care. - Encourage visually impaired, hearing impaired and aphasic patients to use assistive/communication devices  7/11/2023 0935 by Luz Marina Moraes RN  Outcome: Progressing  7/11/2023 0759 by Luz Marina Moraes RN  Outcome: Progressing     Problem: MOBILITY - ADULT  Goal: Maintain or return to baseline ADL function  Description: INTERVENTIONS:  -  Assess patient's ability to carry out ADLs; assess patient's baseline for ADL function and identify physical deficits which impact ability to perform ADLs (bathing, care of mouth/teeth, toileting, grooming, dressing, etc.)  - Assess/evaluate cause of self-care deficits   - Assess range of motion  - Assess patient's mobility; develop plan if impaired  - Assess patient's need for assistive devices and provide as appropriate  - Encourage maximum independence but intervene and supervise when necessary  - Involve family in performance of ADLs  - Assess for home care needs following discharge   - Consider OT consult to assist with ADL evaluation and planning for discharge  - Provide patient education as appropriate  7/11/2023 0935 by Luz Marina Moraes RN  Outcome: Progressing  7/11/2023 0759 by Luz Marina Moraes RN  Outcome: Progressing  Goal: Maintains/Returns to pre admission functional level  Description: INTERVENTIONS:  - Perform BMAT or MOVE assessment daily.   - Set and communicate daily mobility goal to care team and patient/family/caregiver. - Collaborate with rehabilitation services on mobility goals if consulted  - Perform Range of Motion  times a day. - Reposition patient every  hours.   - Dangle patient  times a day  - Stand patient  times a day  - Ambulate patient  times a day  - Out of bed to chair  times a day   - Out of bed for meal times a day  - Out of bed for toileting  - Record patient progress and toleration of activity level   7/11/2023 0935 by Luz Marina Moraes RN  Outcome: Progressing  7/11/2023 0759 by Juaquin Underwood RN  Outcome: Progressing     Problem: Neurological Deficit  Goal: Neurological status is stable or improving  Description: Interventions:  - Monitor and assess patient's level of consciousness, motor function, sensory function, and level of assistance needed for ADLs. - Monitor and report changes from baseline. Collaborate with interdisciplinary team to initiate plan and implement interventions as ordered. - Provide and maintain a safe environment. - Consider seizure precautions. - Consider fall precautions. - Consider aspiration precautions. - Consider bleeding precautions. Outcome: Progressing     Problem: Activity Intolerance/Impaired Mobility  Goal: Mobility/activity is maintained at optimum level for patient  Description: Interventions:  - Assess and monitor patient  barriers to mobility and need for assistive/adaptive devices. - Assess patient's emotional response to limitations. - Collaborate with interdisciplinary team and initiate plans and interventions as ordered. - Encourage independent activity per ability.  - Maintain proper body alignment. - Perform active/passive rom as tolerated/ordered. - Plan activities to conserve energy.  - Turn patient as appropriate  Outcome: Progressing     Problem: Communication Impairment  Goal: Ability to express needs and understand communication  Description: Assess patient's communication skills and ability to understand information. Patient will demonstrate use of effective communication techniques, alternative methods of communication and understanding even if not able to speak. - Encourage communication and provide alternate methods of communication as needed. - Collaborate with case management/ for discharge needs. - Include patient/family/caregiver in decisions related to communication.   Outcome: Progressing     Problem: Potential for Aspiration  Goal: Non-ventilated patient's risk of aspiration is minimized  Description: Assess and monitor vital signs, respiratory status, and labs (WBC). Monitor for signs of aspiration (tachypnea, cough, rales, wheezing, cyanosis, fever). - Assess and monitor patient's ability to swallow. - Place patient up in chair to eat if possible. - HOB up at 90 degrees to eat if unable to get patient up into chair.  - Supervise patient during oral intake. - Instruct patient/ family to take small bites. - Instruct patient/ family to take small single sips when taking liquids. - Follow patient-specific strategies generated by speech pathologist.  Outcome: Progressing  Goal: Ventilated patient's risk of aspiration is minimized  Description: Assess and monitor vital signs, respiratory status, airway cuff pressure, and labs (WBC). Monitor for signs of aspiration (tachypnea, cough, rales, wheezing, cyanosis, fever). - Elevate head of bed 30 degrees if patient has tube feeding.  - Monitor tube feeding. Outcome: Progressing     Problem: Nutrition  Goal: Nutrition/Hydration status is improving  Description: Monitor and assess patient's nutrition/hydration status for malnutrition (ex- brittle hair, bruises, dry skin, pale skin and conjunctiva, muscle wasting, smooth red tongue, and disorientation). Collaborate with interdisciplinary team and initiate plan and interventions as ordered. Monitor patient's weight and dietary intake as ordered or per policy. Utilize nutrition screening tool and intervene per policy. Determine patient's food preferences and provide high-protein, high-caloric foods as appropriate. - Assist patient with eating.  - Allow adequate time for meals.  - Encourage patient to take dietary supplement as ordered. - Collaborate with clinical nutritionist.  - Include patient/family/caregiver in decisions related to nutrition.   Outcome: Progressing

## 2023-07-11 NOTE — ASSESSMENT & PLAN NOTE
Patient presented with dysarthria, weakness, double vision, also feeling of being pulled to the right side. NIHSS 1 on arrival, not a tPA candidate. No prior history of stroke. · Consider TIA vs stroke rule out, admitted under stroke pathway  · CT head, CTA head/neck negative for acute pathology  · MRI brain: No acute infarction, edema or pathologic intra-axial enhancement.   · S/p 325 mg ASA, 300 mg Plavix x 1  · Lipid panel: TGCs 170, HDL 38, LDL 64.  · Echo, HgbA1c: Pending  · Neurology consult:  · Continue DAPT w/ ASA 81 mg and Plavix 75 mg daily x 21 days, followed by ASA monotherapy  · Decrease atorvastatin 40->10mg daily with elevated LFTs, may need to consider D/C if worsens  · Follow-up with neurovascular outpatient in 4 weeks upon discharge  · Telemetry reviewed, NSR with no acute events or arrhythmias

## 2023-07-11 NOTE — CONSULTS
Consultation - [address] [place] [address] Gastroenterology     Jose Agarwal 47 y.o. male MRN: [id number]  Unit/Bed#: -01 Encounter: [id number]    Inpatient consult to gastroenterology  Consult performed by: Thai Renteria MD  Consult ordered by: Rosette Prieto PA-C          ASSESSMENT and PLAN    1. Elevated LFTs  2. ETOH abuse  3. Chronic thrombocytopenia  4. Stroke-like episode    53M with ETOH abuse adm with stroke alert, now on aspirin and plavix, GI consulted for elev LFTs in setting of ETOH abuse. AST only mildly elevated, TB 2.23 and although the thrombocytopenia is concerning for chronic alcoholic cirrhosis, MELD currently at 15 without obvious decompensations. More likely ETOH induced thrombocytopenia and transaminitis. - Acute viral hepatitis panel neg  - Agree with obtaining ultrasound r/o obstruction  - CIWA protocol, monitor for withdrawal  - Check daily MELD labs while inpatient  - Pending ultrasound results, consider further work up  - Strict ETOH cessation advised  - No contraindication from GI perspective to continue aspirin and plavix for now. - If ultrasound otherwise normal, recommend outpatient GI follow up    Chief Complaint   Patient presents with   • STROKE Alert     Went to bed at 2200 last night normal, this AM woke up feeling "off." C/o dizziness, "feeling like I'm drunk," double vision, stumbling. Physician Requesting Consult: Alberto Mtz MD    HPI  Jose Agarwal is a 47y.o. year old male pmhx sig for ETOH use presenting to the ER with stroke like symptoms of blurry vision. CT/MRI neg for acute CVA. Neuro on board and pt started on asa/plavix. GI consulted for elev AST. Pt is a known ETOH user. Also w mildly elev INR and thrombocytopenia. Denies any N/V/D/GIB. Currently feeling better. No LE edema or easy bruising.      Historical Information   Past Medical History:   Diagnosis Date   • Insomnia      Past Surgical History:   Procedure Laterality Date   • WISDOM TOOTH EXTRACTION       Social History   Social History     Substance and Sexual Activity   Alcohol Use Never     Social History     Substance and Sexual Activity   Drug Use No     Social History     Tobacco Use   Smoking Status Never   Smokeless Tobacco Current   • Types: Chew     History reviewed. No pertinent family history. Meds/Allergies     Current Facility-Administered Medications   Medication Dose Route Frequency   • acetaminophen (TYLENOL) tablet 650 mg  650 mg Oral Q6H PRN   • aspirin chewable tablet 81 mg  81 mg Oral Daily   • atorvastatin (LIPITOR) tablet 10 mg  10 mg Oral QPM   • clopidogrel (PLAVIX) tablet 75 mg  75 mg Oral Daily   • enoxaparin (LOVENOX) subcutaneous injection 40 mg  40 mg Subcutaneous Q24H JUAN   • loperamide (IMODIUM) capsule 2 mg  2 mg Oral 4x Daily PRN   • nicotine (NICODERM CQ) 14 mg/24hr TD 24 hr patch 1 patch  1 patch Transdermal Daily   • rOPINIRole (REQUIP) tablet 0.5 mg  0.5 mg Oral HS   • zolpidem (AMBIEN) tablet 10 mg  10 mg Oral HS PRN     Medications Prior to Admission   Medication   • amLODIPine (Norvasc) 10 mg tablet   • chlorthalidone 25 mg tablet   • FAMOTIDINE PO   • loperamide (IMODIUM) 2 mg capsule   • losartan (COZAAR) 100 MG tablet   • metoprolol succinate (TOPROL-XL) 100 mg 24 hr tablet   • rOPINIRole (REQUIP) 0.5 mg tablet   • zolpidem (AMBIEN CR) 12.5 MG CR tablet   • hydrOXYzine HCL (ATARAX) 25 mg tablet   • ibuprofen (MOTRIN) 600 mg tablet       No Known Allergies    PHYSICAL EXAM    Blood pressure 129/83, pulse 73, temperature 98.5 °F (36.9 °C), resp. rate 18, height 6' (1.829 m), weight 99.8 kg (220 lb), SpO2 97 %. Body mass index is 29.84 kg/m². General Appearance: NAD, cooperative, alert  Eyes: Anicteric, PERRLA, EOMI  ENT:  Normocephalic, atraumatic, normal mucosa.     Neck:  Supple, symmetrical, trachea midline  Resp:  Clear to auscultation bilaterally; no rales, rhonchi or wheezing; respirations unlabored   CV:  S1 S2, Regular rate and rhythm; no murmur, rub, or gallop. GI:  Soft, non-tender, non-distended; normal bowel sounds; no masses, no organomegaly   Rectal: Deferred  Musculoskeletal: No cyanosis, clubbing or edema. Normal ROM. Skin:  No jaundice, rashes, or lesions   Heme/Lymph: No palpable cervical lymphadenopathy  Psych: Normal affect, good eye contact  Neuro: No gross deficits, AAOx3    Lab Results   Component Value Date    CALCIUM 9.2 07/11/2023    K 3.7 07/11/2023    CO2 25 07/11/2023     07/11/2023    BUN 14 07/11/2023    CREATININE 1.12 07/11/2023     Lab Results   Component Value Date    WBC 7.38 07/11/2023    HGB 12.0 07/11/2023    HCT 37.1 07/11/2023    MCV 90 07/11/2023    PLT 90 (L) 07/11/2023     Lab Results   Component Value Date    ALT 21 07/11/2023    AST 42 (H) 07/11/2023    ALKPHOS 138 (H) 07/11/2023     No results found for: "AMYLASE"  No results found for: "LIPASE"  No results found for: "IRON", "TIBC", "FERRITIN"  Lab Results   Component Value Date    INR 1.43 (H) 07/11/2023       Imaging Studies:     EKG, Pathology, and Other Studies:     REVIEW OF SYSTEMS:    CONSTITUTIONAL: Denies any fever, chills, rigors, and weight loss. HEENT: No earache or tinnitus. Denies hearing loss or visual disturbances. CARDIOVASCULAR: No chest pain or palpitations. RESPIRATORY: Denies any cough, hemoptysis, shortness of breath or dyspnea on exertion. GASTROINTESTINAL: As noted in the History of Present Illness. GENITOURINARY: No problems with urination. Denies any hematuria or dysuria. NEUROLOGIC: No dizziness or vertigo, denies headaches. MUSCULOSKELETAL: Denies any muscle or joint pain. SKIN: Denies skin rashes or itching. ENDOCRINE: Denies excessive thirst. Denies intolerance to heat or cold. PSYCHOSOCIAL: Denies depression or anxiety. Denies any recent memory loss.

## 2023-07-11 NOTE — PHYSICAL THERAPY NOTE
Physical Therapy Screen    Patient Name: Mady Riley    MKAAF'F Date: 7/11/2023     Problem List  Principal Problem:    Stroke-like episode  Active Problems:    Transaminitis    Hypomagnesemia       Past Medical History  Past Medical History:   Diagnosis Date    Insomnia         Past Surgical History  Past Surgical History:   Procedure Laterality Date    WISDOM TOOTH EXTRACTION          07/11/23 1327   PT Last Visit   PT Visit Date 07/11/23   Note Type   Note type Screen   Additional Comments Chart review completed. Spoke with patient who reports that he has been independent in the room and that his symptoms have resolved. No inpatient P.T. needs D/C P. T. Donte Nicholson

## 2023-07-11 NOTE — ASSESSMENT & PLAN NOTE
Elevated LFTs POA: AST 49, alk phos 125, total bili 1.08. Also elevated INR 1.25, ethanol levels 103 POA. · Suspect underlying liver disease with alcohol use  · Hepatitis A, B, C: Nonreactive. HIV nonreactive.     · Ammonia levels wnl  · Alk phos, total bili, INR trended up today  · Check RUQ US, consult GI

## 2023-07-11 NOTE — PROGRESS NOTES
[facility]  Progress Note  Name: Teagan Gould  MRN: [id number]  Unit/Bed#: MS [facility]-01 I Date of Admission: 7/10/2023   Date of Service: 7/11/2023 I Hospital Day: 1    Assessment/Plan   * Stroke-like episode  Assessment & Plan  Patient presented with dysarthria, weakness, double vision, also feeling of being pulled to the right side. NIHSS 1 on arrival, not a tPA candidate. No prior history of stroke. · Consider TIA vs stroke rule out, admitted under stroke pathway  · CT head, CTA head/neck negative for acute pathology  · MRI brain: No acute infarction, edema or pathologic intra-axial enhancement. · S/p 325 mg ASA, 300 mg Plavix x 1  · Lipid panel: TGCs 170, HDL 38, LDL 64.  · Echo, HgbA1c: Pending  · Neurology consult:  · Continue DAPT w/ ASA 81 mg and Plavix 75 mg daily x 21 days, followed by ASA monotherapy  · Decrease atorvastatin 40->10mg daily with elevated LFTs, may need to consider D/C if worsens  · Follow-up with neurovascular outpatient in 4 weeks upon discharge  · Telemetry reviewed, NSR with no acute events or arrhythmias    Transaminitis  Assessment & Plan  Elevated LFTs POA: AST 49, alk phos 125, total bili 1.08. Also elevated INR 1.25, ethanol levels 103 POA. · Suspect underlying liver disease with alcohol use  · Hepatitis A, B, C: Nonreactive. HIV nonreactive. · Ammonia levels wnl  · Alk phos, total bili, INR trended up today  · Check RUQ US, consult GI    Hypomagnesemia  Assessment & Plan  · Mild hypomagnesemia, Mag 1.5 today  · Replete, check Mag again in a.m. VTE Pharmacologic Prophylaxis: VTE Score: 7 High Risk (Score >/= 5) - Pharmacological DVT Prophylaxis Ordered: enoxaparin (Lovenox). Sequential Compression Devices Ordered. Patient Centered Rounds: I performed bedside rounds with nursing staff today.   Discussions with Specialists or Other Care Team Provider: Neurology, case management    Education and Discussions with Family / Patient: Patient declined call to . Total Time Spent on Date of Encounter in care of patient: 45 minutes This time was spent on one or more of the following: performing physical exam; counseling and coordination of care; obtaining or reviewing history; documenting in the medical record; reviewing/ordering tests, medications or procedures; communicating with other healthcare professionals and discussing with patient's family/caregivers. Current Length of Stay: 1 day(s)  Current Patient Status: Inpatient   Certification Statement: The patient will continue to require additional inpatient hospital stay due to Transaminitis, ultrasound, GI consult  Discharge Plan: Anticipate discharge in 24-48 hrs to home. Code Status: Level 1 - Full Code    Subjective:   Patient is seen at bedside this a.m., reports resolution of difficulty with speech, weakness and diplopia/double vision. Reports mild frontal headache but no migraine features. Denies nausea/vomiting, diarrhea, abdominal pain, chest pain, SOB. Objective:     Vitals:   Temp (24hrs), Av.7 °F (37.1 °C), Min:98.4 °F (36.9 °C), Max:99 °F (37.2 °C)    Temp:  [98.4 °F (36.9 °C)-99 °F (37.2 °C)] 98.5 °F (36.9 °C)  HR:  [62-84] 73  Resp:  [12-22] 18  BP: (102-138)/(52-87) 129/83  SpO2:  [91 %-97 %] 97 %  Body mass index is 29.84 kg/m². Input and Output Summary (last 24 hours): Intake/Output Summary (Last 24 hours) at 2023 1308  Last data filed at 2023 5243  Gross per 24 hour   Intake 125 ml   Output 700 ml   Net -575 ml       Physical Exam:   Physical Exam  Constitutional:       General: He is not in acute distress. Appearance: He is not ill-appearing, toxic-appearing or diaphoretic. HENT:      Head: Normocephalic and atraumatic. Eyes:      Extraocular Movements: Extraocular movements intact. Cardiovascular:      Rate and Rhythm: Normal rate and regular rhythm. Pulses: Normal pulses. Heart sounds: Normal heart sounds. Pulmonary:      Effort: Pulmonary effort is normal. No respiratory distress. Breath sounds: Normal breath sounds. Abdominal:      General: Bowel sounds are normal. There is no distension. Palpations: Abdomen is soft. Tenderness: There is no abdominal tenderness. There is no guarding. Musculoskeletal:         General: No swelling or tenderness. Skin:     General: Skin is warm. Coloration: Skin is not jaundiced. Neurological:      General: No focal deficit present. Mental Status: He is alert and oriented to person, place, and time. Cranial Nerves: No cranial nerve deficit. Sensory: No sensory deficit. Motor: No weakness. Comments: No pronator drift. No binocular diplopia.    Psychiatric:         Mood and Affect: Mood normal.         Behavior: Behavior normal.          Additional Data:     Labs:  Results from last 7 days   Lab Units 07/11/23  0924   WBC Thousand/uL 7.38   HEMOGLOBIN g/dL 12.0   HEMATOCRIT % 37.1   PLATELETS Thousands/uL 90*   NEUTROS PCT % 75   LYMPHS PCT % 17   MONOS PCT % 5   EOS PCT % 2     Results from last 7 days   Lab Units 07/11/23  0924   SODIUM mmol/L 137   POTASSIUM mmol/L 3.7   CHLORIDE mmol/L 103   CO2 mmol/L 25   BUN mg/dL 14   CREATININE mg/dL 1.12   ANION GAP mmol/L 9   CALCIUM mg/dL 9.2   ALBUMIN g/dL 3.9   TOTAL BILIRUBIN mg/dL 2.23*   ALK PHOS U/L 138*   ALT U/L 21   AST U/L 42*   GLUCOSE RANDOM mg/dL 166*     Results from last 7 days   Lab Units 07/11/23  0924   INR  1.43*     Results from last 7 days   Lab Units 07/11/23  0709   POC GLUCOSE mg/dl 103               Lines/Drains:  Invasive Devices     Peripheral Intravenous Line  Duration           Peripheral IV 07/10/23 Distal;Right;Upper;Ventral (anterior) Arm 1 day                  Telemetry:  Telemetry Orders (From admission, onward)             24 Hour Telemetry Monitoring  (ED Bridging Orders Panel)  Continuous x 24 Hours (Telem)        Question:  Reason for 24 Hour Telemetry Answer:  TIA/Suspected CVA/ Confirmed CVA                 Telemetry Reviewed: Normal Sinus Rhythm  Indication for Continued Telemetry Use: Acute CVA             Imaging: Reviewed radiology reports from this admission including: CT head and MRI brain    Recent Cultures (last 7 days):         Last 24 Hours Medication List:   Current Facility-Administered Medications   Medication Dose Route Frequency Provider Last Rate   • acetaminophen  650 mg Oral Q6H PRN Kiya Mcduffie PA-C     • aspirin  81 mg Oral Daily Harry Massey MD     • atorvastatin  10 mg Oral QPM Luz Marina Joya PA-C     • clopidogrel  75 mg Oral Daily Velia Burns PA-C     • enoxaparin  40 mg Subcutaneous Q24H [facility] Mery Junior PA-C     • loperamide  2 mg Oral 4x Daily PRN Kiya Mcduffie PA-C     • nicotine  1 patch Transdermal Daily Harry Massey MD     • rOPINIRole  0.5 mg Oral HS Kiya Mcduffie PA-C     • zolpidem  10 mg Oral HS PRN Kiya Mcduffie PA-C          Today, Patient Was Seen By: Mery Junior PA-C    **Please Note: This note may have been constructed using a voice recognition system. **

## 2023-07-11 NOTE — PROGRESS NOTES
Progress Note - Neurology   Tree Patel 47 y.o. male MRN: [id number]  Unit/Bed#: -01 Encounter: [id number]    Assessment/Plan   * Stroke-like episode  Assessment & Plan  47year old male, who presents with horizontal diplopia, as well as dysarthria and a feeling of being pulled to the right. NIH of 1. The patient is not at tnk candidate, as he is out of the window secondary to time. The patient is not a thrombectomy candidate, no lvo seen or critical care stenosis. The patient was not a candidate for awake up stroke protocol with his low NIH. Labs and testing:  MRI the brain with no acute infarct, edema or pathological intra-axial enhancement. CTA of the head and neck showed no flow-limiting stenosis or occlusion the neck or head. Echo  LDL was 64, trig 170H  Ammonia was 72  HIV normal  TSH normal  Ethanol was 103  Platelets 044 L  AST 49    Suspect MRI negative stroke vs TIA. - Stroke pathway, admit to medicine team  • Echo with bubble study  • Hemoglobin A1c pending   • Load with Aspirin 325 mg once, Plavix 300 mg once, followed by Aspirin 81 mg and Plavix 75 mg, starting 7/11, this will be completed for 21 days, than aspirin monotherapy. • Atorvastatin 40 mg, will decrease dose to 10 mg as he has elevated AST and signs of liver disease - as well LDL was 64. • Normotensive blood pressure  • Continue telemetry  • PT/OT/ST  • Frequent neuro checks. Continue to monitor and notify neurology with any changes. - Medical management and supportive care per primary team. Correction of any metabolic or infectious disturbances. The patient had an elevated ammonia, decreased plts elevated AST. -Counseled on etoh cessation.   -Examined alongside attending, please see attestation for details. Tree Patel will need follow up in in 4 weeks with neurovascular attending. He will not require outpatient neurological testing. At this time. Subjective:   Neurological follow-up.   Patient reports that his results diplopia has resolved. The patient denies any right upper or lower extremity weakness this has improved as well. 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  No new neurological complaints. The patient denies any history of liver disease. He reports 3 drinks of whisky the night prior. ROS:  12 point review of symptoms as per HPI otherwise negative    Vitals: Blood pressure 129/83, pulse 73, temperature 98.5 °F (36.9 °C), resp. rate 18, height 6' (1.829 m), weight 99.8 kg (220 lb), SpO2 97 %. ,Body mass index is 29.84 kg/m². Current Facility-Administered Medications   Medication Dose Route Frequency Provider Last Rate   • acetaminophen  650 mg Oral Q6H PRN Naye Wiley PA-C     • aspirin  81 mg Oral Daily Julien Wilson MD     • atorvastatin  10 mg Oral QPM Gilman Dance, PA-C     • clopidogrel  75 mg Oral Daily Shalini Burns PA-C     • loperamide  2 mg Oral 4x Daily PRN Naye Wiley PA-C     • nicotine  1 patch Transdermal Daily Julien Wilson MD     • rOPINIRole  0.5 mg Oral HS Naye Wiley PA-C     • zolpidem  10 mg Oral HS PRN Naye Wiley PA-C         Physical Exam:   Neurological  Mental status - the patient is awake alert oriented x 3, with no evidence of aphasia or dysarthria, patient is able to follow simple commands, is able to follow complex commands. No para-phasic errors noted. Normal attn and concentration. Cranial nerves 2 through 12 are intact, he does have slight asymmetry of his right face, right eye ptosis, and decreased nasolabial fold. No diplopia on end gaze bilaterally. Motor - 5/5 upper extremities and lower extremities, without drift, normal tone and bulk. No drift in the upper or lowers, non focal motor examination. He does have atrophy of the left thumb and pointer finger. No tremor noted. Rapid movements are equal bilaterally  Sensation - non-focal to touch, no neglect noted.    Coordination -  no ataxia noted on finger-to-nose or heel-to-shin  Toes are down-going bilaterally  No evidence of seizure activity, observed. Gait normal.     NIH is a zero. (Examined alongside attending physician). Lab, Imaging and other studies:   I have personally reviewed pertinent reports. , CBC:   Results from last 7 days   Lab Units 07/10/23  1056   WBC Thousand/uL 7.53   RBC Million/uL 4.19   HEMOGLOBIN g/dL 12.1   HEMATOCRIT % 38.0   MCV fL 91   PLATELETS Thousands/uL 120*   , BMP/CMP:   Results from last 7 days   Lab Units 07/11/23  0924 07/10/23  1203 07/10/23  1056   SODIUM mmol/L 137  --  140   POTASSIUM mmol/L 3.7  --  4.0   CHLORIDE mmol/L 103  --  108   CO2 mmol/L 25  --  24   BUN mg/dL 14  --  11   CREATININE mg/dL 1.12  --  1.18   CALCIUM mg/dL 9.2  --  9.5   AST U/L 42* 49*  --    ALT U/L 21 23  --    ALK PHOS U/L 138* 125*  --    EGFR ml/min/1.73sq m 75  --  70   , Vitamin B12:   , HgBA1C:   , TSH:   Results from last 7 days   Lab Units 07/11/23  0439   TSH 3RD GENERATON uIU/mL 2.049   , Coagulation:   Results from last 7 days   Lab Units 07/11/23  0924   INR  1.43*   , Lipid Profile:   Results from last 7 days   Lab Units 07/10/23  1056   HDL mg/dL 38*   LDL CALC mg/dL 64   TRIGLYCERIDES mg/dL 170*   , Ammonia:   Results from last 7 days   Lab Units 07/10/23  1203   AMMONIA umol/L 72   , Urinalysis:       Invalid input(s): "URIBILINOGEN", Drug Screen:   , Medication Drug Levels:       Invalid input(s): "CARBAMAZEPINE", "LACOSAMIDE", "OXCARBAZEPINE"     Procedure: MRI brain w wo contrast    Result Date: 7/10/2023  Narrative: MRI BRAIN WITH AND WITHOUT CONTRAST INDICATION: Stroke. COMPARISON: CTA head and neck 7/10/2023 TECHNIQUE: Multiplanar, multisequence imaging of the brain was performed before and after gadolinium administration. IV Contrast:  10 mL of Gadobutrol injection (SINGLE-DOSE) IMAGE QUALITY:   Pulsation artifacts are noted on the FLAIR sequence along the mesial temporal lobes.  FINDINGS: BRAIN PARENCHYMA:  There is no discrete mass, mass effect or midline shift. There is no intracranial hemorrhage. Normal posterior fossa. Diffusion imaging is unremarkable. There are no white matter changes in the cerebral hemispheres. Postcontrast imaging of the brain demonstrates no abnormal enhancement. VENTRICLES:  Normal for the patient's age. SELLA AND PITUITARY GLAND:  Normal. ORBITS:  Normal. PARANASAL SINUSES:  Normal. VASCULATURE: Hypoplastic right transverse and sigmoid sinuses. CALVARIUM AND SKULL BASE:  Normal. EXTRACRANIAL SOFT TISSUES:  Normal.     Impression: 1. No acute infarction, edema, or pathologic intra-axial enhancement. 2. Hypoplastic right transverse and sigmoid sinuses. Workstation performed: [id number]     Procedure: CTA stroke alert (head/neck)    Result Date: 7/10/2023  Narrative: CTA NECK AND BRAIN WITH CONTRAST INDICATION: Stroke Alert. Gait ataxia. Diplopia. Dysarthria. Right-sided ptosis. COMPARISON:   None. TECHNIQUE:   Post contrast imaging was performed after administration of iodinated contrast through the neck and brain. Post contrast axial 0.625 mm images timed to opacify the arterial system. 3D rendering was performed on an independent workstation. MIP reconstructions performed. Coronal reconstructions were performed of the noncontrast portion of the brain. Radiation dose length product (DLP) for this visit:  437.39 mGy-cm . This examination, like all CT scans performed in the [facility], was performed utilizing techniques to minimize radiation dose exposure, including the use of iterative  reconstruction and automated exposure control. IV Contrast:  85 mL of iohexol (OMNIPAQUE) IMAGE QUALITY:   Diagnostic FINDINGS: CERVICAL VASCULATURE AORTIC ARCH AND GREAT VESSELS:  Normal aortic arch and great vessel origins. Normal visualized subclavian vessels. RIGHT VERTEBRAL ARTERY CERVICAL SEGMENT:  Normal origin. The vessel is normal in caliber throughout the neck. No dissection. LEFT VERTEBRAL ARTERY CERVICAL SEGMENT:  Normal origin. The vessel is normal in caliber throughout the neck. No dissection. RIGHT EXTRACRANIAL CAROTID SEGMENT:  Normal caliber common carotid artery. Normal bifurcation and cervical internal carotid artery. No stenosis or dissection. LEFT EXTRACRANIAL CAROTID SEGMENT:  Normal caliber common carotid artery. Normal bifurcation and cervical internal carotid artery. No stenosis or dissection. NASCET criteria was used to determine the degree of internal carotid artery diameter stenosis. INTRACRANIAL VASCULATURE INTERNAL CAROTID ARTERIES:  Normal enhancement of the intracranial portions of the internal carotid arteries. Normal ophthalmic artery origins. Normal ICA terminus. ANTERIOR CIRCULATION: Relatively hypoplastic right A1 anterior cerebral artery segment. Normal anterior communicating artery. Solitary patent large caliber proximal A2 segment consistent with anatomic variation. Distal anterior cerebral arteries are patent. MIDDLE CEREBRAL ARTERY CIRCULATION:  M1 segment and middle cerebral artery branches demonstrate normal enhancement bilaterally. DISTAL VERTEBRAL ARTERIES:  Normal distal vertebral arteries. Posterior inferior cerebellar artery origins are normal. Normal vertebral basilar junction. BASILAR ARTERY:  Basilar artery is normal in caliber. Normal superior cerebellar arteries. POSTERIOR CEREBRAL ARTERIES: Both posterior cerebral arteries arises from the basilar tip. Both arteries demonstrate normal enhancement. No vascular enhancement of either posterior communicating artery consistent with anatomic variation VENOUS STRUCTURES:  Normal. NON VASCULAR ANATOMY BONY STRUCTURES:  No acute osseous abnormality. SOFT TISSUES OF THE NECK:  Normal. THORACIC INLET:  Unremarkable. Impression: No flow-limiting cerebrovascular stenosis or occlusion in the neck. No flow-limiting cerebrovascular stenosis or occlusion in the head.  I reported these results to Kristin Mulligan via HIPAA compliant secure electronic messaging on 7/10/2023 11:15 AM. Workstation performed: [id number]     Procedure: CT stroke alert brain    Result Date: 7/10/2023  Narrative: CT BRAIN - STROKE ALERT PROTOCOL INDICATION:   Stroke Alert. Gait ataxia. Diplopia. Dysarthria. Right-sided ptosis COMPARISON:  None. TECHNIQUE:  CT examination of the brain was performed. In addition to axial images, coronal reformatted images were created and submitted for interpretation. Radiation dose length product (DLP) for this visit:  895.11 mGy-cm . This examination, like all CT scans performed in the [facility], was performed utilizing techniques to minimize radiation dose exposure, including the use of iterative  reconstruction and automated exposure control. IMAGE QUALITY:  Diagnostic. FINDINGS: PARENCHYMA:  No intracranial mass, mass effect or midline shift. No CT signs of acute infarction. No acute parenchymal hemorrhage. Normal intracranial vasculature. VENTRICLES AND EXTRA-AXIAL SPACES:  Normal for the patient's age. VISUALIZED ORBITS: Normal visualized orbits. PARANASAL SINUSES: Minimal mucosal thickening in the left maxillary sinus. Retention cyst in the right sphenoid sinus. Sinuses and mastoid air cells are otherwise clear. CALVARIUM AND EXTRACRANIAL SOFT TISSUES:   Normal.     Impression: No acute intracranial abnormality. I reported these results to Kristin Mulligan via HIPAA compliant secure electronic messaging on 7/10/2023 11:15 AM. Workstation performed: [id number]     Counseling / Coordination of Care  Reviewed case with neurology attending, history and physical examination, labs and imaging completed, plan of care as per attending physician. Please see attestation for further details. Examined alongside attending physician. Please see attestation by attending physician.

## 2023-07-12 ENCOUNTER — APPOINTMENT (INPATIENT)
Dept: MRI IMAGING | Facility: HOSPITAL | Age: 53
DRG: 065 | End: 2023-07-12
Payer: COMMERCIAL

## 2023-07-12 PROBLEM — F10.10 ALCOHOL ABUSE: Status: ACTIVE | Noted: 2023-07-12

## 2023-07-12 PROBLEM — D69.6 THROMBOCYTOPENIA (HCC): Status: ACTIVE | Noted: 2023-07-12

## 2023-07-12 LAB
ALBUMIN SERPL BCP-MCNC: 3.8 G/DL (ref 3.5–5)
ALP SERPL-CCNC: 132 U/L (ref 34–104)
ALT SERPL W P-5'-P-CCNC: 18 U/L (ref 7–52)
ANION GAP SERPL CALCULATED.3IONS-SCNC: 9 MMOL/L
AST SERPL W P-5'-P-CCNC: 35 U/L (ref 13–39)
BASOPHILS # BLD AUTO: 0.03 THOUSANDS/ÂΜL (ref 0–0.1)
BASOPHILS NFR BLD AUTO: 0 % (ref 0–1)
BILIRUB DIRECT SERPL-MCNC: 0.8 MG/DL (ref 0–0.2)
BILIRUB SERPL-MCNC: 2.12 MG/DL (ref 0.2–1)
BUN SERPL-MCNC: 14 MG/DL (ref 5–25)
CALCIUM SERPL-MCNC: 8.9 MG/DL (ref 8.4–10.2)
CHLORIDE SERPL-SCNC: 104 MMOL/L (ref 96–108)
CO2 SERPL-SCNC: 24 MMOL/L (ref 21–32)
CREAT SERPL-MCNC: 1 MG/DL (ref 0.6–1.3)
EOSINOPHIL # BLD AUTO: 0.19 THOUSAND/ÂΜL (ref 0–0.61)
EOSINOPHIL NFR BLD AUTO: 3 % (ref 0–6)
ERYTHROCYTE [DISTWIDTH] IN BLOOD BY AUTOMATED COUNT: 14.1 % (ref 11.6–15.1)
EST. AVERAGE GLUCOSE BLD GHB EST-MCNC: 97 MG/DL
GFR SERPL CREATININE-BSD FRML MDRD: 86 ML/MIN/1.73SQ M
GLUCOSE SERPL-MCNC: 95 MG/DL (ref 65–140)
HBA1C MFR BLD: 5 %
HCT VFR BLD AUTO: 36.4 % (ref 36.5–49.3)
HGB BLD-MCNC: 11.9 G/DL (ref 12–17)
IMM GRANULOCYTES # BLD AUTO: 0.02 THOUSAND/UL (ref 0–0.2)
IMM GRANULOCYTES NFR BLD AUTO: 0 % (ref 0–2)
INR PPP: 1.41 (ref 0.84–1.19)
LYMPHOCYTES # BLD AUTO: 1.5 THOUSANDS/ÂΜL (ref 0.6–4.47)
LYMPHOCYTES NFR BLD AUTO: 21 % (ref 14–44)
MAGNESIUM SERPL-MCNC: 1.8 MG/DL (ref 1.9–2.7)
MCH RBC QN AUTO: 29.4 PG (ref 26.8–34.3)
MCHC RBC AUTO-ENTMCNC: 32.7 G/DL (ref 31.4–37.4)
MCV RBC AUTO: 90 FL (ref 82–98)
MONOCYTES # BLD AUTO: 0.45 THOUSAND/ÂΜL (ref 0.17–1.22)
MONOCYTES NFR BLD AUTO: 6 % (ref 4–12)
NEUTROPHILS # BLD AUTO: 4.81 THOUSANDS/ÂΜL (ref 1.85–7.62)
NEUTS SEG NFR BLD AUTO: 70 % (ref 43–75)
NRBC BLD AUTO-RTO: 0 /100 WBCS
PLATELET # BLD AUTO: 81 THOUSANDS/UL (ref 149–390)
PMV BLD AUTO: 11.9 FL (ref 8.9–12.7)
POTASSIUM SERPL-SCNC: 3.9 MMOL/L (ref 3.5–5.3)
PROT SERPL-MCNC: 6.8 G/DL (ref 6.4–8.4)
PROTHROMBIN TIME: 18.1 SECONDS (ref 11.6–14.5)
RBC # BLD AUTO: 4.05 MILLION/UL (ref 3.88–5.62)
SODIUM SERPL-SCNC: 137 MMOL/L (ref 135–147)
WBC # BLD AUTO: 7 THOUSAND/UL (ref 4.31–10.16)

## 2023-07-12 PROCEDURE — 83735 ASSAY OF MAGNESIUM: CPT | Performed by: PHYSICIAN ASSISTANT

## 2023-07-12 PROCEDURE — 82248 BILIRUBIN DIRECT: CPT | Performed by: INTERNAL MEDICINE

## 2023-07-12 PROCEDURE — 99232 SBSQ HOSP IP/OBS MODERATE 35: CPT | Performed by: PHYSICIAN ASSISTANT

## 2023-07-12 PROCEDURE — 74181 MRI ABDOMEN W/O CONTRAST: CPT

## 2023-07-12 PROCEDURE — 99232 SBSQ HOSP IP/OBS MODERATE 35: CPT | Performed by: INTERNAL MEDICINE

## 2023-07-12 PROCEDURE — 85025 COMPLETE CBC W/AUTO DIFF WBC: CPT | Performed by: STUDENT IN AN ORGANIZED HEALTH CARE EDUCATION/TRAINING PROGRAM

## 2023-07-12 PROCEDURE — 85610 PROTHROMBIN TIME: CPT | Performed by: PHYSICIAN ASSISTANT

## 2023-07-12 PROCEDURE — 80053 COMPREHEN METABOLIC PANEL: CPT | Performed by: PHYSICIAN ASSISTANT

## 2023-07-12 RX ORDER — LANOLIN ALCOHOL/MO/W.PET/CERES
100 CREAM (GRAM) TOPICAL DAILY
Status: DISCONTINUED | OUTPATIENT
Start: 2023-07-12 | End: 2023-07-13 | Stop reason: HOSPADM

## 2023-07-12 RX ORDER — CHLORTHALIDONE 25 MG/1
25 TABLET ORAL DAILY
Status: DISCONTINUED | OUTPATIENT
Start: 2023-07-12 | End: 2023-07-13 | Stop reason: HOSPADM

## 2023-07-12 RX ORDER — MAGNESIUM SULFATE 1 G/100ML
1 INJECTION INTRAVENOUS ONCE
Status: COMPLETED | OUTPATIENT
Start: 2023-07-12 | End: 2023-07-12

## 2023-07-12 RX ORDER — FOLIC ACID 1 MG/1
1 TABLET ORAL DAILY
Status: DISCONTINUED | OUTPATIENT
Start: 2023-07-12 | End: 2023-07-13 | Stop reason: HOSPADM

## 2023-07-12 RX ORDER — LOSARTAN POTASSIUM 50 MG/1
100 TABLET ORAL DAILY
Status: DISCONTINUED | OUTPATIENT
Start: 2023-07-12 | End: 2023-07-13 | Stop reason: HOSPADM

## 2023-07-12 RX ORDER — METOPROLOL SUCCINATE 50 MG/1
100 TABLET, EXTENDED RELEASE ORAL DAILY
Status: DISCONTINUED | OUTPATIENT
Start: 2023-07-12 | End: 2023-07-13 | Stop reason: HOSPADM

## 2023-07-12 RX ADMIN — ZOLPIDEM TARTRATE 10 MG: 5 TABLET ORAL at 21:12

## 2023-07-12 RX ADMIN — CLOPIDOGREL BISULFATE 75 MG: 75 TABLET ORAL at 09:01

## 2023-07-12 RX ADMIN — ACETAMINOPHEN 650 MG: 325 TABLET, FILM COATED ORAL at 09:08

## 2023-07-12 RX ADMIN — FOLIC ACID 1 MG: 1 TABLET ORAL at 15:14

## 2023-07-12 RX ADMIN — MAGNESIUM SULFATE IN DEXTROSE 1 G: 10 INJECTION, SOLUTION INTRAVENOUS at 09:00

## 2023-07-12 RX ADMIN — MULTIPLE VITAMINS W/ MINERALS TAB 1 TABLET: TAB ORAL at 15:14

## 2023-07-12 RX ADMIN — LOSARTAN POTASSIUM 100 MG: 50 TABLET, FILM COATED ORAL at 11:24

## 2023-07-12 RX ADMIN — NICOTINE 1 PATCH: 14 PATCH, EXTENDED RELEASE TRANSDERMAL at 09:00

## 2023-07-12 RX ADMIN — LOPERAMIDE HYDROCHLORIDE 2 MG: 2 CAPSULE ORAL at 21:09

## 2023-07-12 RX ADMIN — ATORVASTATIN CALCIUM 10 MG: 10 TABLET, FILM COATED ORAL at 17:24

## 2023-07-12 RX ADMIN — CHLORTHALIDONE 25 MG: 25 TABLET ORAL at 11:24

## 2023-07-12 RX ADMIN — ROPINIROLE HYDROCHLORIDE 0.5 MG: 0.25 TABLET, FILM COATED ORAL at 21:09

## 2023-07-12 RX ADMIN — METOPROLOL SUCCINATE 100 MG: 50 TABLET, EXTENDED RELEASE ORAL at 11:24

## 2023-07-12 RX ADMIN — ENOXAPARIN SODIUM 40 MG: 100 INJECTION SUBCUTANEOUS at 09:01

## 2023-07-12 RX ADMIN — THIAMINE HCL TAB 100 MG 100 MG: 100 TAB at 15:14

## 2023-07-12 RX ADMIN — ASPIRIN 81 MG CHEWABLE TABLET 81 MG: 81 TABLET CHEWABLE at 09:01

## 2023-07-12 NOTE — ASSESSMENT & PLAN NOTE
Elevated LFTs POA: AST 49, alk phos 125, total bili 1.08. Also elevated INR 1.25, ethanol levels 103 POA. Ammonia levels wnl. · Suspect underlying liver disease with alcohol use  · Hepatitis A, B, C: Nonreactive. HIV nonreactive. · KATY US: Hepatomegaly and hepatic steatosis. Slightly prominent common bile duct measuring 7 mm. Consider evaluation with MRI abdomen with MRCP.   · GI consult:  · Check MRCP/MRI abdomen to further evaluate CBD  · Monitor LFTs daily

## 2023-07-12 NOTE — PROGRESS NOTES
[address] [facility]  Progress Note  Name: Sukhi Lewis  MRN: [id number]  Unit/Bed#: -01 I Date of Admission: 7/10/2023   Date of Service: 7/12/2023 I Hospital Day: 2    Assessment/Plan   * Stroke-like episode  Assessment & Plan  Patient presented with dysarthria, weakness, double vision, also feeling of being pulled to the right side. NIHSS 1 on arrival, not a tPA candidate. No prior history of stroke. · Consider TIA vs tiny brainstem stroke missed on MRI  · CT head, CTA head/neck negative for acute pathology  · MRI brain: No acute infarction, edema or pathologic intra-axial enhancement. · A1c: 5.0. Lipid panel: TGCs 170, HDL 38, LDL 64.  · Echo: EF 18%, normal systolic and diastolic function. · S/p 325 mg ASA, 300 mg Plavix x 1 in ED  · Neurology consult:  · Continue DAPT w/ ASA 81 mg and Plavix 75 mg daily x 21 days, followed by ASA monotherapy  · Decrease atorvastatin 40->10mg daily with elevated LFTs, may need to consider D/C if worsens  · Follow-up with neurovascular outpatient in 4 weeks upon discharge  · Telemetry reviewed, NSR with no acute events or arrhythmias, D/C monitoring    Transaminitis  Assessment & Plan  Elevated LFTs POA: AST 49, alk phos 125, total bili 1.08. Also elevated INR 1.25, ethanol levels 103 POA. Ammonia levels wnl. · Suspect underlying liver disease with alcohol use  · Hepatitis A, B, C: Nonreactive. HIV nonreactive. · KATY US: Hepatomegaly and hepatic steatosis. Slightly prominent common bile duct measuring 7 mm. Consider evaluation with MRI abdomen with MRCP.   · GI consult:  · Check MRCP/MRI abdomen to further evaluate CBD  · Monitor LFTs daily    Thrombocytopenia (HCC)  Assessment & Plan  · Thrombocytopenia during admission  · Likely in setting of alcohol abuse  · Platelets declined to 81K today  · Monitor CBC closely, hold DAPT and consult hematology if platelets <22N    Alcohol abuse  Assessment & Plan  · Patient admits to every day alcohol use, drinks approximately 10 oz of whiskey daily  · Reports years ago having withdrawal symptoms with tremors, although none recently. Denies seizure history. · [facility] protocol  · Thiamine, folate, MV supplementation    Hypomagnesemia  Assessment & Plan  · Mild hypomagnesemia during admission  · Repleting, check Mag again in a.m. VTE Pharmacologic Prophylaxis: VTE Score: 7 High Risk (Score >/= 5) - Pharmacological DVT Prophylaxis Ordered: enoxaparin (Lovenox). Sequential Compression Devices Ordered. Patient Centered Rounds: I performed bedside rounds with nursing staff today. Discussions with Specialists or Other Care Team Provider: Neurology, GI, case management    Education and Discussions with Family / Patient: Patient declined call to . Total Time Spent on Date of Encounter in care of patient: 45 minutes This time was spent on one or more of the following: performing physical exam; counseling and coordination of care; obtaining or reviewing history; documenting in the medical record; reviewing/ordering tests, medications or procedures; communicating with other healthcare professionals and discussing with patient's family/caregivers. Current Length of Stay: 2 day(s)  Current Patient Status: Inpatient   Certification Statement: The patient will continue to require additional inpatient hospital stay due to MRCP, GI clearance  Discharge Plan: Anticipate discharge in 24-48 hrs to home. Code Status: Level 1 - Full Code    Subjective:   Patient is seen at bedside this a.m., reports resolution of double vision, noting mild frontal headache but otherwise no neurological symptoms. Denies nausea/vomiting, however does note mild abdominal pain/discomfort.     Objective:     Vitals:   Temp (24hrs), Av.6 °F (37 °C), Min:98.5 °F (36.9 °C), Max:98.8 °F (37.1 °C)    Temp:  [98.5 °F (36.9 °C)-98.8 °F (37.1 °C)] 98.5 °F (36.9 °C)  HR:  [74-88] 74  Resp:  [14-18] 18  BP: (118-145)/(69-90) 121/82  SpO2:  [93 %-95 %] 94 %  Body mass index is 29.84 kg/m². Input and Output Summary (last 24 hours): Intake/Output Summary (Last 24 hours) at 7/12/2023 1529  Last data filed at 7/11/2023 1815  Gross per 24 hour   Intake 200 ml   Output --   Net 200 ml       Physical Exam:   Physical Exam  Constitutional:       General: He is not in acute distress. Appearance: He is not ill-appearing, toxic-appearing or diaphoretic. Cardiovascular:      Rate and Rhythm: Normal rate and regular rhythm. Pulses: Normal pulses. Heart sounds: Normal heart sounds. Pulmonary:      Effort: Pulmonary effort is normal. No respiratory distress. Breath sounds: Normal breath sounds. Abdominal:      General: Bowel sounds are normal. There is no distension. Palpations: Abdomen is soft. Tenderness: There is abdominal tenderness. There is no guarding. Comments: Mild tenderness to palpation of epigastric region   Musculoskeletal:         General: No swelling or tenderness. Skin:     General: Skin is warm. Neurological:      General: No focal deficit present. Mental Status: He is alert. Mental status is at baseline.    Psychiatric:         Mood and Affect: Mood normal.         Behavior: Behavior normal.         Additional Data:     Labs:  Results from last 7 days   Lab Units 07/12/23  0850   WBC Thousand/uL 7.00   HEMOGLOBIN g/dL 11.9*   HEMATOCRIT % 36.4*   PLATELETS Thousands/uL 81*   NEUTROS PCT % 70   LYMPHS PCT % 21   MONOS PCT % 6   EOS PCT % 3     Results from last 7 days   Lab Units 07/12/23  0511   SODIUM mmol/L 137   POTASSIUM mmol/L 3.9   CHLORIDE mmol/L 104   CO2 mmol/L 24   BUN mg/dL 14   CREATININE mg/dL 1.00   ANION GAP mmol/L 9   CALCIUM mg/dL 8.9   ALBUMIN g/dL 3.8   TOTAL BILIRUBIN mg/dL 2.12*   ALK PHOS U/L 132*   ALT U/L 18   AST U/L 35   GLUCOSE RANDOM mg/dL 95     Results from last 7 days   Lab Units 07/12/23  0511   INR  1.41*     Results from last 7 days   Lab Units 07/11/23  0709   POC GLUCOSE mg/dl 103     Results from last 7 days   Lab Units 07/10/23  1056   HEMOGLOBIN A1C % 5.0           Lines/Drains:  Invasive Devices     Peripheral Intravenous Line  Duration           Peripheral IV 07/10/23 Distal;Right;Upper;Ventral (anterior) Arm 2 days                      Imaging: Reviewed radiology reports from this admission including: chest xray and ultrasound(s)    Recent Cultures (last 7 days):         Last 24 Hours Medication List:   Current Facility-Administered Medications   Medication Dose Route Frequency Provider Last Rate   • acetaminophen  650 mg Oral Q6H PRN Rosemary Mendes PA-C     • aspirin  81 mg Oral Daily Radha Avery MD     • atorvastatin  10 mg Oral QPM Jessie ShabbonaRACHEL     • chlorthalidone  25 mg Oral Daily Jessica Lorenzo PA-C     • clopidogrel  75 mg Oral Daily [facility] RACHEL Burns     • enoxaparin  40 mg Subcutaneous Q24H [address] Jessica Lorenzo PA-C     • folic acid  1 mg Oral Daily Jessica Lorenzo PA-C     • loperamide  2 mg Oral 4x Daily PRN Rosemary Mendes PA-C     • losartan  100 mg Oral Daily Ashley Fabian PA-C     • metoprolol succinate  100 mg Oral Daily Jessica Lorenzo PA-C     • multivitamin-minerals  1 tablet Oral Daily Jessica Lorenzo PA-C     • nicotine  1 patch Transdermal Daily Radha Avery MD     • rOPINIRole  0.5 mg Oral HS Maddy Gonzalez PA-C     • thiamine  100 mg Oral Daily Jessica Lorenzo PA-C     • zolpidem  10 mg Oral HS PRN Rosemary Mendes PA-C          Today, Patient Was Seen By: Jessica Lorenzo PA-C    **Please Note: This note may have been constructed using a voice recognition system. **

## 2023-07-12 NOTE — PLAN OF CARE
Problem: Potential for Falls  Goal: Patient will remain free of falls  Description: INTERVENTIONS:  - Educate patient/family on patient safety including physical limitations  - Instruct patient to call for assistance with activity   - Consult OT/PT to assist with strengthening/mobility   - Keep Call bell within reach  - Keep bed low and locked with side rails adjusted as appropriate  - Keep care items and personal belongings within reach  - Initiate and maintain comfort rounds  - Make Fall Risk Sign visible to staff  - Offer Toileting every X Hours, in advance of need  - Initiate/Maintain Xalarm  - Obtain necessary fall risk management equipment: X  - Apply yellow socks and bracelet for high fall risk patients  - Consider moving patient to room near nurses station  Outcome: Progressing     Problem: PAIN - ADULT  Goal: Verbalizes/displays adequate comfort level or baseline comfort level  Description: Interventions:  - Encourage patient to monitor pain and request assistance  - Assess pain using appropriate pain scale  - Administer analgesics based on type and severity of pain and evaluate response  - Implement non-pharmacological measures as appropriate and evaluate response  - Consider cultural and social influences on pain and pain management  - Notify physician/advanced practitioner if interventions unsuccessful or patient reports new pain  Outcome: Progressing     Problem: INFECTION - ADULT  Goal: Absence or prevention of progression during hospitalization  Description: INTERVENTIONS:  - Assess and monitor for signs and symptoms of infection  - Monitor lab/diagnostic results  - Monitor all insertion sites, i.e. indwelling lines, tubes, and drains  - Monitor endotracheal if appropriate and nasal secretions for changes in amount and color  - [place] appropriate cooling/warming therapies per order  - Administer medications as ordered  - Instruct and encourage patient and family to use good hand hygiene technique  - Identify and instruct in appropriate isolation precautions for identified infection/condition  Outcome: Progressing  Goal: Absence of fever/infection during neutropenic period  Description: INTERVENTIONS:  - Monitor WBC    Outcome: Progressing     Problem: SAFETY ADULT  Goal: Patient will remain free of falls  Description: INTERVENTIONS:  - Educate patient/family on patient safety including physical limitations  - Instruct patient to call for assistance with activity   - Consult OT/PT to assist with strengthening/mobility   - Keep Call bell within reach  - Keep bed low and locked with side rails adjusted as appropriate  - Keep care items and personal belongings within reach  - Initiate and maintain comfort rounds  - Make Fall Risk Sign visible to staff  - Offer Toileting every X Hours, in advance of need  - Initiate/Maintain Xalarm  - Obtain necessary fall risk management equipment: X  - Apply yellow socks and bracelet for high fall risk patients  - Consider moving patient to room near nurses station  Outcome: Progressing  Goal: Maintain or return to baseline ADL function  Description: INTERVENTIONS:  -  Assess patient's ability to carry out ADLs; assess patient's baseline for ADL function and identify physical deficits which impact ability to perform ADLs (bathing, care of mouth/teeth, toileting, grooming, dressing, etc.)  - Assess/evaluate cause of self-care deficits   - Assess range of motion  - Assess patient's mobility; develop plan if impaired  - Assess patient's need for assistive devices and provide as appropriate  - Encourage maximum independence but intervene and supervise when necessary  - Involve family in performance of ADLs  - Assess for home care needs following discharge   - Consider OT consult to assist with ADL evaluation and planning for discharge  - Provide patient education as appropriate  Outcome: Progressing  Goal: Maintains/Returns to pre admission functional level  Description: INTERVENTIONS:  - Perform BMAT or MOVE assessment daily.   - Set and communicate daily mobility goal to care team and patient/family/caregiver. - Collaborate with rehabilitation services on mobility goals if consulted  - Perform Range of Motion X times a day. - Reposition patient every X hours. - Dangle patient X times a day  - Stand patient X times a day  - Ambulate patient X times a day  - Out of bed to chair XXX times a day   - Out of bed for meals X times a day  - Out of bed for toileting  - Record patient progress and toleration of activity level   Outcome: Progressing     Problem: DISCHARGE PLANNING  Goal: Discharge to home or other facility with appropriate resources  Description: INTERVENTIONS:  - Identify barriers to discharge w/patient and caregiver  - Arrange for needed discharge resources and transportation as appropriate  - Identify discharge learning needs (meds, wound care, etc.)  - Arrange for interpretive services to assist at discharge as needed  - Refer to Case Management Department for coordinating discharge planning if the patient needs post-hospital services based on physician/advanced practitioner order or complex needs related to functional status, cognitive ability, or social support system  Outcome: Progressing     Problem: Knowledge Deficit  Goal: Patient/family/caregiver demonstrates understanding of disease process, treatment plan, medications, and discharge instructions  Description: Complete learning assessment and assess knowledge base.   Interventions:  - Provide teaching at level of understanding  - Provide teaching via preferred learning methods  Outcome: Progressing     Problem: NEUROSENSORY - ADULT  Goal: Achieves stable or improved neurological status  Description: INTERVENTIONS  - Monitor and report changes in neurological status  - Monitor vital signs such as temperature, blood pressure, glucose, and any other labs ordered   - Initiate measures to prevent increased intracranial pressure  - Monitor for seizure activity and implement precautions if appropriate      Outcome: Progressing  Goal: Remains free of injury related to seizures activity  Description: INTERVENTIONS  - Maintain airway, patient safety  and administer oxygen as ordered  - Monitor patient for seizure activity, document and report duration and description of seizure to physician/advanced practitioner  - If seizure occurs,  ensure patient safety during seizure  - Reorient patient post seizure  - Seizure pads on all 4 side rails  - Instruct patient/family to notify RN of any seizure activity including if an aura is experienced  - Instruct patient/family to call for assistance with activity based on nursing assessment  - Administer anti-seizure medications if ordered    Outcome: Progressing  Goal: Achieves maximal functionality and self care  Description: INTERVENTIONS  - Monitor swallowing and airway patency with patient fatigue and changes in neurological status  - Encourage and assist patient to increase activity and self care.    - Encourage visually impaired, hearing impaired and aphasic patients to use assistive/communication devices  Outcome: Progressing     Problem: MOBILITY - ADULT  Goal: Maintain or return to baseline ADL function  Description: INTERVENTIONS:  -  Assess patient's ability to carry out ADLs; assess patient's baseline for ADL function and identify physical deficits which impact ability to perform ADLs (bathing, care of mouth/teeth, toileting, grooming, dressing, etc.)  - Assess/evaluate cause of self-care deficits   - Assess range of motion  - Assess patient's mobility; develop plan if impaired  - Assess patient's need for assistive devices and provide as appropriate  - Encourage maximum independence but intervene and supervise when necessary  - Involve family in performance of ADLs  - Assess for home care needs following discharge   - Consider OT consult to assist with ADL evaluation and planning for discharge  - Provide patient education as appropriate  Outcome: Progressing  Goal: Maintains/Returns to pre admission functional level  Description: INTERVENTIONS:  - Perform BMAT or MOVE assessment daily.   - Set and communicate daily mobility goal to care team and patient/family/caregiver. - Collaborate with rehabilitation services on mobility goals if consulted  - Perform Range of Motion X times a day. - Reposition patient every X hours. - Dangle patient X times a day  - Stand patient X times a day  - Ambulate patient X times a day  - Out of bed to chair X times a day   - Out of bed for meals X times a day  - Out of bed for toileting  - Record patient progress and toleration of activity level   Outcome: Progressing     Problem: Neurological Deficit  Goal: Neurological status is stable or improving  Description: Interventions:  - Monitor and assess patient's level of consciousness, motor function, sensory function, and level of assistance needed for ADLs. - Monitor and report changes from baseline. Collaborate with interdisciplinary team to initiate plan and implement interventions as ordered. - Provide and maintain a safe environment. - Consider seizure precautions. - Consider fall precautions. - Consider aspiration precautions. - Consider bleeding precautions. Outcome: Progressing     Problem: Activity Intolerance/Impaired Mobility  Goal: Mobility/activity is maintained at optimum level for patient  Description: Interventions:  - Assess and monitor patient  barriers to mobility and need for assistive/adaptive devices. - Assess patient's emotional response to limitations. - Collaborate with interdisciplinary team and initiate plans and interventions as ordered. - Encourage independent activity per ability.  - Maintain proper body alignment. - Perform active/passive rom as tolerated/ordered.   - Plan activities to conserve energy.  - Turn patient as appropriate  Outcome: Progressing Problem: Communication Impairment  Goal: Ability to express needs and understand communication  Description: Assess patient's communication skills and ability to understand information. Patient will demonstrate use of effective communication techniques, alternative methods of communication and understanding even if not able to speak. - Encourage communication and provide alternate methods of communication as needed. - Collaborate with case management/ for discharge needs. - Include patient/family/caregiver in decisions related to communication. Outcome: Progressing     Problem: Potential for Aspiration  Goal: Non-ventilated patient's risk of aspiration is minimized  Description: Assess and monitor vital signs, respiratory status, and labs (WBC). Monitor for signs of aspiration (tachypnea, cough, rales, wheezing, cyanosis, fever). - Assess and monitor patient's ability to swallow. - Place patient up in chair to eat if possible. - HOB up at 90 degrees to eat if unable to get patient up into chair.  - Supervise patient during oral intake. - Instruct patient/ family to take small bites. - Instruct patient/ family to take small single sips when taking liquids. - Follow patient-specific strategies generated by speech pathologist.  Outcome: Progressing  Goal: Ventilated patient's risk of aspiration is minimized  Description: Assess and monitor vital signs, respiratory status, airway cuff pressure, and labs (WBC). Monitor for signs of aspiration (tachypnea, cough, rales, wheezing, cyanosis, fever). - Elevate head of bed 30 degrees if patient has tube feeding.  - Monitor tube feeding. Outcome: Progressing     Problem: Nutrition  Goal: Nutrition/Hydration status is improving  Description: Monitor and assess patient's nutrition/hydration status for malnutrition (ex- brittle hair, bruises, dry skin, pale skin and conjunctiva, muscle wasting, smooth red tongue, and disorientation). Collaborate with interdisciplinary team and initiate plan and interventions as ordered. Monitor patient's weight and dietary intake as ordered or per policy. Utilize nutrition screening tool and intervene per policy. Determine patient's food preferences and provide high-protein, high-caloric foods as appropriate. - Assist patient with eating.  - Allow adequate time for meals.  - Encourage patient to take dietary supplement as ordered. - Collaborate with clinical nutritionist.  - Include patient/family/caregiver in decisions related to nutrition.   Outcome: Progressing

## 2023-07-12 NOTE — ASSESSMENT & PLAN NOTE
Patient presented with dysarthria, weakness, double vision, also feeling of being pulled to the right side. NIHSS 1 on arrival, not a tPA candidate. No prior history of stroke. · Consider TIA vs tiny brainstem stroke missed on MRI  · CT head, CTA head/neck negative for acute pathology  · MRI brain: No acute infarction, edema or pathologic intra-axial enhancement. · A1c: 5.0. Lipid panel: TGCs 170, HDL 38, LDL 64.  · Echo: EF 14%, normal systolic and diastolic function.   · S/p 325 mg ASA, 300 mg Plavix x 1 in ED  · Neurology consult:  · Continue DAPT w/ ASA 81 mg and Plavix 75 mg daily x 21 days, followed by ASA monotherapy  · Decrease atorvastatin 40->10mg daily with elevated LFTs, may need to consider D/C if worsens  · Follow-up with neurovascular outpatient in 4 weeks upon discharge  · Telemetry reviewed, NSR with no acute events or arrhythmias, D/C monitoring

## 2023-07-12 NOTE — ASSESSMENT & PLAN NOTE
Pt presents with c/o neck and back pain x15 days. States that he woke up with pain. He states that he saw chiropractor 3x last week and also saw massage therapist but only temporary relief.  Pt tried naproxen and ibuprofen but no relief.  Pt states chiropractor said possible bulging disc.    · Thrombocytopenia during admission  · Likely in setting of alcohol abuse  · Platelets declined to 81K today  · Monitor CBC closely, hold DAPT and consult hematology if platelets <64W

## 2023-07-12 NOTE — PLAN OF CARE
Problem: Potential for Falls  Goal: Patient will remain free of falls  Description: INTERVENTIONS:  - Educate patient/family on patient safety including physical limitations  - Instruct patient to call for assistance with activity   - Consult OT/PT to assist with strengthening/mobility   - Keep Call bell within reach  - Keep bed low and locked with side rails adjusted as appropriate  - Keep care items and personal belongings within reach  - Initiate and maintain comfort rounds  - Make Fall Risk Sign visible to staff  - Offer Toileting every 2 Hours, in advance of need  - Initiate/Maintain 2alarm  - Obtain necessary fall risk management equipment: 2  - Apply yellow socks and bracelet for high fall risk patients  - Consider moving patient to room near nurses station  Outcome: Progressing     Problem: PAIN - ADULT  Goal: Verbalizes/displays adequate comfort level or baseline comfort level  Description: Interventions:  - Encourage patient to monitor pain and request assistance  - Assess pain using appropriate pain scale  - Administer analgesics based on type and severity of pain and evaluate response  - Implement non-pharmacological measures as appropriate and evaluate response  - Consider cultural and social influences on pain and pain management  - Notify physician/advanced practitioner if interventions unsuccessful or patient reports new pain  Outcome: Progressing     Problem: INFECTION - ADULT  Goal: Absence or prevention of progression during hospitalization  Description: INTERVENTIONS:  - Assess and monitor for signs and symptoms of infection  - Monitor lab/diagnostic results  - Monitor all insertion sites, i.e. indwelling lines, tubes, and drains  - Monitor endotracheal if appropriate and nasal secretions for changes in amount and color  - Emmett appropriate cooling/warming therapies per order  - Administer medications as ordered  - Instruct and encourage patient and family to use good hand hygiene technique  - Identify and instruct in appropriate isolation precautions for identified infection/condition  Outcome: Progressing  Goal: Absence of fever/infection during neutropenic period  Description: INTERVENTIONS:  - Monitor WBC    Outcome: Progressing     Problem: SAFETY ADULT  Goal: Patient will remain free of falls  Description: INTERVENTIONS:  - Educate patient/family on patient safety including physical limitations  - Instruct patient to call for assistance with activity   - Consult OT/PT to assist with strengthening/mobility   - Keep Call bell within reach  - Keep bed low and locked with side rails adjusted as appropriate  - Keep care items and personal belongings within reach  - Initiate and maintain comfort rounds  - Make Fall Risk Sign visible to staff  - Offer Toileting every 2 Hours, in advance of need  - Initiate/Maintain 2alarm  - Obtain necessary fall risk management equipment: 2  - Apply yellow socks and bracelet for high fall risk patients  - Consider moving patient to room near nurses station  Outcome: Progressing  Goal: Maintain or return to baseline ADL function  Description: INTERVENTIONS:  -  Assess patient's ability to carry out ADLs; assess patient's baseline for ADL function and identify physical deficits which impact ability to perform ADLs (bathing, care of mouth/teeth, toileting, grooming, dressing, etc.)  - Assess/evaluate cause of self-care deficits   - Assess range of motion  - Assess patient's mobility; develop plan if impaired  - Assess patient's need for assistive devices and provide as appropriate  - Encourage maximum independence but intervene and supervise when necessary  - Involve family in performance of ADLs  - Assess for home care needs following discharge   - Consider OT consult to assist with ADL evaluation and planning for discharge  - Provide patient education as appropriate  Outcome: Progressing  Goal: Maintains/Returns to pre admission functional level  Description: INTERVENTIONS:  - Perform BMAT or MOVE assessment daily.   - Set and communicate daily mobility goal to care team and patient/family/caregiver. - Collaborate with rehabilitation services on mobility goals if consulted  - Perform Range of Motion 2 times a day. - Reposition patient every 2 hours. - Dangle patient 2 times a day  - Stand patient 2 times a day  - Ambulate patient 2 times a day  - Out of bed to chair 2 times a day   - Out of bed for meals 2 times a day  - Out of bed for toileting  - Record patient progress and toleration of activity level   Outcome: Progressing     Problem: DISCHARGE PLANNING  Goal: Discharge to home or other facility with appropriate resources  Description: INTERVENTIONS:  - Identify barriers to discharge w/patient and caregiver  - Arrange for needed discharge resources and transportation as appropriate  - Identify discharge learning needs (meds, wound care, etc.)  - Arrange for interpretive services to assist at discharge as needed  - Refer to Case Management Department for coordinating discharge planning if the patient needs post-hospital services based on physician/advanced practitioner order or complex needs related to functional status, cognitive ability, or social support system  Outcome: Progressing     Problem: Knowledge Deficit  Goal: Patient/family/caregiver demonstrates understanding of disease process, treatment plan, medications, and discharge instructions  Description: Complete learning assessment and assess knowledge base.   Interventions:  - Provide teaching at level of understanding  - Provide teaching via preferred learning methods  Outcome: Progressing     Problem: NEUROSENSORY - ADULT  Goal: Achieves stable or improved neurological status  Description: INTERVENTIONS  - Monitor and report changes in neurological status  - Monitor vital signs such as temperature, blood pressure, glucose, and any other labs ordered   - Initiate measures to prevent increased intracranial pressure  - Monitor for seizure activity and implement precautions if appropriate      Outcome: Progressing  Goal: Remains free of injury related to seizures activity  Description: INTERVENTIONS  - Maintain airway, patient safety  and administer oxygen as ordered  - Monitor patient for seizure activity, document and report duration and description of seizure to physician/advanced practitioner  - If seizure occurs,  ensure patient safety during seizure  - Reorient patient post seizure  - Seizure pads on all 4 side rails  - Instruct patient/family to notify RN of any seizure activity including if an aura is experienced  - Instruct patient/family to call for assistance with activity based on nursing assessment  - Administer anti-seizure medications if ordered    Outcome: Progressing  Goal: Achieves maximal functionality and self care  Description: INTERVENTIONS  - Monitor swallowing and airway patency with patient fatigue and changes in neurological status  - Encourage and assist patient to increase activity and self care.    - Encourage visually impaired, hearing impaired and aphasic patients to use assistive/communication devices  Outcome: Progressing     Problem: MOBILITY - ADULT  Goal: Maintain or return to baseline ADL function  Description: INTERVENTIONS:  -  Assess patient's ability to carry out ADLs; assess patient's baseline for ADL function and identify physical deficits which impact ability to perform ADLs (bathing, care of mouth/teeth, toileting, grooming, dressing, etc.)  - Assess/evaluate cause of self-care deficits   - Assess range of motion  - Assess patient's mobility; develop plan if impaired  - Assess patient's need for assistive devices and provide as appropriate  - Encourage maximum independence but intervene and supervise when necessary  - Involve family in performance of ADLs  - Assess for home care needs following discharge   - Consider OT consult to assist with ADL evaluation and planning for discharge  - Provide patient education as appropriate  Outcome: Progressing  Goal: Maintains/Returns to pre admission functional level  Description: INTERVENTIONS:  - Perform BMAT or MOVE assessment daily.   - Set and communicate daily mobility goal to care team and patient/family/caregiver. - Collaborate with rehabilitation services on mobility goals if consulted  - Perform Range of Motion 2 times a day. - Reposition patient every 2 hours. - Dangle patient 2 times a day  - Stand patient 2 times a day  - Ambulate patient 2 times a day  - Out of bed to chair 2 times a day   - Out of bed for meals 2 times a day  - Out of bed for toileting  - Record patient progress and toleration of activity level   Outcome: Progressing     Problem: Neurological Deficit  Goal: Neurological status is stable or improving  Description: Interventions:  - Monitor and assess patient's level of consciousness, motor function, sensory function, and level of assistance needed for ADLs. - Monitor and report changes from baseline. Collaborate with interdisciplinary team to initiate plan and implement interventions as ordered. - Provide and maintain a safe environment. - Consider seizure precautions. - Consider fall precautions. - Consider aspiration precautions. - Consider bleeding precautions. Outcome: Progressing     Problem: Activity Intolerance/Impaired Mobility  Goal: Mobility/activity is maintained at optimum level for patient  Description: Interventions:  - Assess and monitor patient  barriers to mobility and need for assistive/adaptive devices. - Assess patient's emotional response to limitations. - Collaborate with interdisciplinary team and initiate plans and interventions as ordered. - Encourage independent activity per ability.  - Maintain proper body alignment. - Perform active/passive rom as tolerated/ordered.   - Plan activities to conserve energy.  - Turn patient as appropriate  Outcome: Progressing Problem: Communication Impairment  Goal: Ability to express needs and understand communication  Description: Assess patient's communication skills and ability to understand information. Patient will demonstrate use of effective communication techniques, alternative methods of communication and understanding even if not able to speak. - Encourage communication and provide alternate methods of communication as needed. - Collaborate with case management/ for discharge needs. - Include patient/family/caregiver in decisions related to communication. Outcome: Progressing     Problem: Potential for Aspiration  Goal: Non-ventilated patient's risk of aspiration is minimized  Description: Assess and monitor vital signs, respiratory status, and labs (WBC). Monitor for signs of aspiration (tachypnea, cough, rales, wheezing, cyanosis, fever). - Assess and monitor patient's ability to swallow. - Place patient up in chair to eat if possible. - HOB up at 90 degrees to eat if unable to get patient up into chair.  - Supervise patient during oral intake. - Instruct patient/ family to take small bites. - Instruct patient/ family to take small single sips when taking liquids. - Follow patient-specific strategies generated by speech pathologist.  Outcome: Progressing  Goal: Ventilated patient's risk of aspiration is minimized  Description: Assess and monitor vital signs, respiratory status, airway cuff pressure, and labs (WBC). Monitor for signs of aspiration (tachypnea, cough, rales, wheezing, cyanosis, fever). - Elevate head of bed 30 degrees if patient has tube feeding.  - Monitor tube feeding. Outcome: Progressing     Problem: Nutrition  Goal: Nutrition/Hydration status is improving  Description: Monitor and assess patient's nutrition/hydration status for malnutrition (ex- brittle hair, bruises, dry skin, pale skin and conjunctiva, muscle wasting, smooth red tongue, and disorientation). Collaborate with interdisciplinary team and initiate plan and interventions as ordered. Monitor patient's weight and dietary intake as ordered or per policy. Utilize nutrition screening tool and intervene per policy. Determine patient's food preferences and provide high-protein, high-caloric foods as appropriate. - Assist patient with eating.  - Allow adequate time for meals.  - Encourage patient to take dietary supplement as ordered. - Collaborate with clinical nutritionist.  - Include patient/family/caregiver in decisions related to nutrition.   Outcome: Progressing

## 2023-07-12 NOTE — PROGRESS NOTES
Progress note - Gastroenterology   Gonzalo Sheriff 47 y.o. male MRN: [id number]  Unit/Bed#: -01 Encounter: [id number]    ASSESSMENT and PLAN    53M with ETOH abuse adm with stroke alert, now on aspirin and plavix, GI consulted for elev LFTs in setting of ETOH abuse.      1. Elevated LFTs  2. ETOH abuse  3. Chronic thrombocytopenia  4. Stroke-like episode    Lift's mildly elevated however trending down, AST 35, ALT 18, alkaline phosphatase 132, T. bili 2.12. Platelets 90 from 8/20. Awaiting a.m. labs from today. Number cytopenia concerning for chronic alcoholic cirrhosis. Ultrasound from 7/11 noted normal liver contour however lately prominent common bile duct at 7 mm. Acute viral hepatitis panel negative. MELD 15 with a.m. labs today. More likely ETOH induced thrombocytopenia and transaminitis.     -MRI ordered r/o obstruction  - CIWA protocol, monitor for withdrawal  - Check daily MELD labs while inpatient  -Discussed with patient importance of strict ETOH cessation     - No contraindication from GI perspective to continue aspirin and plavix for now. Discussed with patient that he has not had a colonoscopy to date however can be followed up as an outpatient in the office. Contact information for office was provided to patient. Chief Complaint   Patient presents with   • STROKE Alert     Went to bed at 2200 last night normal, this AM woke up feeling "off." C/o dizziness, "feeling like I'm drunk," double vision, stumbling. SUBJECTIVE/HPI   Patient states he is feeling well this morning. He does state he has mild epigastric discomfort noted postprandial.  Tolerating his diet. On exam, patient does complain of epigastric tenderness with palpation, denies nausea or vomiting. States he is having normal bowel movement.     /82   Pulse 83   Temp 98.6 °F (37 °C)   Resp 18   Ht 6' (1.829 m)   Wt 99.8 kg (220 lb)   SpO2 93%   BMI 29.84 kg/m²     PHYSICALEXAM  General appearance: alert, appears stated age and cooperative  Eyes: PERLLA, EOMI, no icterus   Head: Normocephalic, without obvious abnormality, atraumatic  Lungs: clear to auscultation bilaterally  Heart: regular rate and rhythm, S1, S2 normal, no murmur, click, rub or gallop  Abdomen: soft, epigastric tenderness increased with palpation; bowel sounds normal; no masses,  no organomegaly  Extremities: extremities normal, atraumatic, no cyanosis or edema  Neurologic: Grossly normal    Lab Results   Component Value Date    CALCIUM 8.9 07/12/2023    K 3.9 07/12/2023    CO2 24 07/12/2023     07/12/2023    BUN 14 07/12/2023    CREATININE 1.00 07/12/2023     Lab Results   Component Value Date    WBC 7.38 07/11/2023    HGB 12.0 07/11/2023    HCT 37.1 07/11/2023    MCV 90 07/11/2023    PLT 90 (L) 07/11/2023     Lab Results   Component Value Date    ALT 18 07/12/2023    AST 35 07/12/2023    ALKPHOS 132 (H) 07/12/2023     No results found for: "AMYLASE"  No results found for: "LIPASE"  No results found for: "IRON", "TIBC", "FERRITIN"  Lab Results   Component Value Date    INR 1.41 (H) 07/12/2023

## 2023-07-12 NOTE — ASSESSMENT & PLAN NOTE
· Patient admits to every day alcohol use, drinks approximately 10 oz of whiskey daily  · Reports years ago having withdrawal symptoms with tremors, although none recently. Denies seizure history.   · [facility] protocol  · Thiamine, folate, MV supplementation

## 2023-07-12 NOTE — UTILIZATION REVIEW
NOTIFICATION OF ADMISSION DISCHARGE   This is a Notification of Discharge from [address] [facility]. Please be advised that this patient has been discharge from our facility. Below you will find the admission and discharge date and time including the patient’s disposition. UTILIZATION REVIEW CONTACT:  Sandra Issa  Utilization   Network Utilization Review Department  Phone: [phone] carefully listen to the prompts. All voicemails are confidential.  Email: [email or web address]     ADMISSION INFORMATION  PRESENTATION DATE: 7/10/2023 10:51 AM  OBERVATION ADMISSION DATE:   INPATIENT ADMISSION DATE: 7/10/23 11:51 AM   DISCHARGE DATE: 7/12/2023 12:12 PM   DISPOSITION:Long Term SNF    IMPORTANT INFORMATION:  Send all requests for admission clinical reviews, approved or denied determinations and any other requests to dedicated fax number below belonging to the campus where the patient is receiving treatment.  List of dedicated fax numbers:  [place] DENIALS (Administrative/Medical Necessity) [phone] [address] [facility] (Maternity/NICU/Pediatrics) [phone]   [facility] [phone]   [facility] [phone] [address] [facility] [phone]   [address] [facility] [phone]   [facility] [phone]   [address] [place] [address] [address] [place] [address] [phone] [address] [facility] [phone] [address] [place] [address] [phone] [address] [facility] [address] [facility] [phone]

## 2023-07-13 VITALS
DIASTOLIC BLOOD PRESSURE: 77 MMHG | HEART RATE: 75 BPM | SYSTOLIC BLOOD PRESSURE: 124 MMHG | OXYGEN SATURATION: 93 % | WEIGHT: 220 LBS | RESPIRATION RATE: 16 BRPM | HEIGHT: 72 IN | BODY MASS INDEX: 29.8 KG/M2 | TEMPERATURE: 98.9 F

## 2023-07-13 PROBLEM — R29.90 STROKE-LIKE EPISODE: Status: RESOLVED | Noted: 2023-07-10 | Resolved: 2023-07-13

## 2023-07-13 LAB
ALBUMIN SERPL BCP-MCNC: 4.1 G/DL (ref 3.5–5)
ALP SERPL-CCNC: 137 U/L (ref 34–104)
ALT SERPL W P-5'-P-CCNC: 20 U/L (ref 7–52)
ANION GAP SERPL CALCULATED.3IONS-SCNC: 8 MMOL/L
AST SERPL W P-5'-P-CCNC: 40 U/L (ref 13–39)
BASOPHILS # BLD AUTO: 0.03 THOUSANDS/ÂΜL (ref 0–0.1)
BASOPHILS NFR BLD AUTO: 0 % (ref 0–1)
BILIRUB SERPL-MCNC: 2.04 MG/DL (ref 0.2–1)
BUN SERPL-MCNC: 13 MG/DL (ref 5–25)
CALCIUM SERPL-MCNC: 9.3 MG/DL (ref 8.4–10.2)
CHLORIDE SERPL-SCNC: 105 MMOL/L (ref 96–108)
CO2 SERPL-SCNC: 24 MMOL/L (ref 21–32)
CREAT SERPL-MCNC: 0.97 MG/DL (ref 0.6–1.3)
EOSINOPHIL # BLD AUTO: 0.23 THOUSAND/ÂΜL (ref 0–0.61)
EOSINOPHIL NFR BLD AUTO: 3 % (ref 0–6)
ERYTHROCYTE [DISTWIDTH] IN BLOOD BY AUTOMATED COUNT: 14.1 % (ref 11.6–15.1)
GFR SERPL CREATININE-BSD FRML MDRD: 89 ML/MIN/1.73SQ M
GLUCOSE SERPL-MCNC: 97 MG/DL (ref 65–140)
GLUCOSE SERPL-MCNC: 97 MG/DL (ref 65–140)
HCT VFR BLD AUTO: 38.9 % (ref 36.5–49.3)
HGB BLD-MCNC: 13 G/DL (ref 12–17)
IMM GRANULOCYTES # BLD AUTO: 0.03 THOUSAND/UL (ref 0–0.2)
IMM GRANULOCYTES NFR BLD AUTO: 0 % (ref 0–2)
INR PPP: 1.3 (ref 0.84–1.19)
LYMPHOCYTES # BLD AUTO: 1.28 THOUSANDS/ÂΜL (ref 0.6–4.47)
LYMPHOCYTES NFR BLD AUTO: 15 % (ref 14–44)
MAGNESIUM SERPL-MCNC: 1.8 MG/DL (ref 1.9–2.7)
MCH RBC QN AUTO: 29.5 PG (ref 26.8–34.3)
MCHC RBC AUTO-ENTMCNC: 33.4 G/DL (ref 31.4–37.4)
MCV RBC AUTO: 88 FL (ref 82–98)
MONOCYTES # BLD AUTO: 0.46 THOUSAND/ÂΜL (ref 0.17–1.22)
MONOCYTES NFR BLD AUTO: 6 % (ref 4–12)
NEUTROPHILS # BLD AUTO: 6.27 THOUSANDS/ÂΜL (ref 1.85–7.62)
NEUTS SEG NFR BLD AUTO: 76 % (ref 43–75)
NRBC BLD AUTO-RTO: 0 /100 WBCS
PLATELET # BLD AUTO: 89 THOUSANDS/UL (ref 149–390)
PMV BLD AUTO: 10.9 FL (ref 8.9–12.7)
POTASSIUM SERPL-SCNC: 3.8 MMOL/L (ref 3.5–5.3)
PROT SERPL-MCNC: 7.1 G/DL (ref 6.4–8.4)
PROTHROMBIN TIME: 17 SECONDS (ref 11.6–14.5)
RBC # BLD AUTO: 4.4 MILLION/UL (ref 3.88–5.62)
SODIUM SERPL-SCNC: 137 MMOL/L (ref 135–147)
WBC # BLD AUTO: 8.3 THOUSAND/UL (ref 4.31–10.16)

## 2023-07-13 PROCEDURE — 80053 COMPREHEN METABOLIC PANEL: CPT | Performed by: PHYSICIAN ASSISTANT

## 2023-07-13 PROCEDURE — 99239 HOSP IP/OBS DSCHRG MGMT >30: CPT | Performed by: PHYSICIAN ASSISTANT

## 2023-07-13 PROCEDURE — 85025 COMPLETE CBC W/AUTO DIFF WBC: CPT | Performed by: PHYSICIAN ASSISTANT

## 2023-07-13 PROCEDURE — 85610 PROTHROMBIN TIME: CPT | Performed by: PHYSICIAN ASSISTANT

## 2023-07-13 PROCEDURE — 82948 REAGENT STRIP/BLOOD GLUCOSE: CPT

## 2023-07-13 PROCEDURE — 99254 IP/OBS CNSLTJ NEW/EST MOD 60: CPT | Performed by: INTERNAL MEDICINE

## 2023-07-13 PROCEDURE — 99231 SBSQ HOSP IP/OBS SF/LOW 25: CPT | Performed by: INTERNAL MEDICINE

## 2023-07-13 PROCEDURE — 83735 ASSAY OF MAGNESIUM: CPT | Performed by: PHYSICIAN ASSISTANT

## 2023-07-13 RX ORDER — ASPIRIN 81 MG/1
81 TABLET, CHEWABLE ORAL DAILY
Qty: 90 TABLET | Refills: 0 | Status: SHIPPED | OUTPATIENT
Start: 2023-07-14

## 2023-07-13 RX ORDER — CLOPIDOGREL BISULFATE 75 MG/1
75 TABLET ORAL DAILY
Qty: 21 TABLET | Refills: 0 | Status: SHIPPED | OUTPATIENT
Start: 2023-07-14

## 2023-07-13 RX ORDER — PHYTONADIONE 5 MG/1
10 TABLET ORAL ONCE
Status: COMPLETED | OUTPATIENT
Start: 2023-07-13 | End: 2023-07-13

## 2023-07-13 RX ORDER — ATORVASTATIN CALCIUM 10 MG/1
10 TABLET, FILM COATED ORAL EVERY EVENING
Qty: 30 TABLET | Refills: 0 | Status: SHIPPED | OUTPATIENT
Start: 2023-07-13

## 2023-07-13 RX ORDER — MAGNESIUM SULFATE HEPTAHYDRATE 40 MG/ML
2 INJECTION, SOLUTION INTRAVENOUS ONCE
Status: COMPLETED | OUTPATIENT
Start: 2023-07-13 | End: 2023-07-13

## 2023-07-13 RX ADMIN — THIAMINE HCL TAB 100 MG 100 MG: 100 TAB at 08:52

## 2023-07-13 RX ADMIN — NICOTINE 1 PATCH: 14 PATCH, EXTENDED RELEASE TRANSDERMAL at 08:51

## 2023-07-13 RX ADMIN — PHYTONADIONE 10 MG: 5 TABLET ORAL at 12:36

## 2023-07-13 RX ADMIN — CHLORTHALIDONE 25 MG: 25 TABLET ORAL at 10:59

## 2023-07-13 RX ADMIN — MAGNESIUM SULFATE HEPTAHYDRATE 2 G: 2 INJECTION, SOLUTION INTRAVENOUS at 08:45

## 2023-07-13 RX ADMIN — ENOXAPARIN SODIUM 40 MG: 100 INJECTION SUBCUTANEOUS at 08:52

## 2023-07-13 RX ADMIN — ASPIRIN 81 MG CHEWABLE TABLET 81 MG: 81 TABLET CHEWABLE at 08:52

## 2023-07-13 RX ADMIN — METOPROLOL SUCCINATE 100 MG: 50 TABLET, EXTENDED RELEASE ORAL at 08:52

## 2023-07-13 RX ADMIN — LOSARTAN POTASSIUM 100 MG: 50 TABLET, FILM COATED ORAL at 08:52

## 2023-07-13 RX ADMIN — MULTIPLE VITAMINS W/ MINERALS TAB 1 TABLET: TAB ORAL at 08:52

## 2023-07-13 RX ADMIN — CLOPIDOGREL BISULFATE 75 MG: 75 TABLET ORAL at 08:52

## 2023-07-13 RX ADMIN — FOLIC ACID 1 MG: 1 TABLET ORAL at 08:52

## 2023-07-13 NOTE — ASSESSMENT & PLAN NOTE
Elevated LFTs POA: AST 49, alk phos 125, total bili 1.08. Also elevated INR 1.25, ethanol levels 103 POA. Ammonia levels wnl. · Hepatitis A, B, C: Nonreactive. HIV nonreactive. · KATY US: Hepatomegaly and hepatic steatosis. Slightly prominent common bile duct measuring 7 mm. Consider evaluation with MRI abdomen with MRCP. · GI consult:  · MRI/MRCP without acute abnormalities.   No common bile duct stone  · Suspect due to alcohol use

## 2023-07-13 NOTE — ASSESSMENT & PLAN NOTE
Patient presented with dysarthria, weakness, double vision, also feeling of being pulled to the right side. NIHSS 1 on arrival, not a tPA candidate. No prior history of stroke. · Consider TIA vs tiny brainstem stroke missed on MRI  · CT head, CTA head/neck negative for acute pathology  · MRI brain: No acute infarction, edema or pathologic intra-axial enhancement. · A1c: 5.0. Lipid panel: TGCs 170, HDL 38, LDL 64.  · Echo: EF 55%, normal systolic and diastolic function.   · S/p 325 mg ASA, 300 mg Plavix x 1 in ED  · Neurology consult:  · Continue DAPT w/ ASA 81 mg and Plavix 75 mg daily x 21 days, followed by ASA monotherapy  · Decrease atorvastatin 40->10mg daily  · Follow-up with neurovascular outpatient in 4 weeks upon discharge  · Stable for discharge home

## 2023-07-13 NOTE — PROGRESS NOTES
Progress note - Gastroenterology   Ariana Lalaevan 47 y.o. male MRN: [id number]  Unit/Bed#: -01 Encounter: [id number]    ASSESSMENT and PLAN    53M with ETOH abuse adm with stroke alert, now on aspirin and plavix, GI consulted for elev LFTs in setting of ETOH abuse.      1. Elevated LFTs  2. ETOH abuse  3. Chronic thrombocytopenia  4. Stroke-like episode    Lift's mildly elevated, relatively stable,  AST 40, ALT 20, alkaline phosphatase 137, T. bili 2.04. Platelets 89 from 0/34. Ultrasound from 7/11 noted normal liver contour, hepatic steatosis, however  prominent common bile duct at 7 mm. Acute viral hepatitis panel negative. MELD 12 with a.m. labs today. More likely ETOH induced thrombocytopenia and transaminitis.     -MRCP 7/12/2023 negative for biliary obstruction given 7 mm CBD on US and MRI, Liver and pancreas unremarkable  - [facility] protocol, monitor for withdrawal  - Check daily MELD labs while inpatient  -Discussed with patient importance of strict ETOH cessation      Discussed with patient that he has not had a colonoscopy to date however can be followed up as an outpatient in the office. Contact information for office was provided to patient.    -No recurrence of neurologic symptoms, MRI/CT head negative. Consider TIA vs tiny brainstem stroke missed on MRI  -- No contraindication from GI perspective to continue aspirin and plavix for now per neurology  -defer to primary team and neurology regarding management of this issue    SUBJECTIVE/HPI   Pt seen and examined. Feeling well. No recurrence of neurological symptoms. Tolerating diet. Notes 1/10 epigastric tenderness with palpation or eating fatty foods. States having 2 loose stools daily since admission controlled with imodium prn.  Pt associates diarrhea with ETOH withdrawal.         /77   Pulse 75   Temp 98.9 °F (37.2 °C)   Resp 16   Ht 6' (1.829 m)   Wt 99.8 kg (220 lb)   SpO2 93%   BMI 29.84 kg/m²     PHYSICALEXAM  General appearance: alert, appears stated age and cooperative  Eyes: PERLLA, EOMI, no icterus   Head: Normocephalic, without obvious abnormality, atraumatic  Lungs: clear to auscultation bilaterally  Heart: regular rate and rhythm, S1, S2 normal, no murmur, click, rub or gallop  Abdomen: soft, mild epigastric tenderness on palpation; bowel sounds normal; no masses,  no organomegaly  Extremities: extremities normal, atraumatic, no cyanosis or edema  Neurologic: Grossly normal    Lab Results   Component Value Date    CALCIUM 9.3 07/13/2023    K 3.8 07/13/2023    CO2 24 07/13/2023     07/13/2023    BUN 13 07/13/2023    CREATININE 0.97 07/13/2023     Lab Results   Component Value Date    WBC 8.30 07/13/2023    HGB 13.0 07/13/2023    HCT 38.9 07/13/2023    MCV 88 07/13/2023    PLT 89 (L) 07/13/2023     Lab Results   Component Value Date    ALT 20 07/13/2023    AST 40 (H) 07/13/2023    ALKPHOS 137 (H) 07/13/2023     No results found for: "AMYLASE"  No results found for: "LIPASE"  No results found for: "IRON", "TIBC", "FERRITIN"  Lab Results   Component Value Date    INR 1.30 (H) 07/13/2023

## 2023-07-13 NOTE — DISCHARGE SUMMARY
[facility]  Discharge- Jayda Eastern 1970, 47 y.o. male MRN: [id number]  Unit/Bed#: -Berta Encounter: [id number]  Primary Care Provider: Cielo Monday, DO   Date and time admitted to hospital: 7/10/2023 10:51 AM    * Stroke-like episode-resolved as of 7/13/2023  Assessment & Plan  Patient presented with dysarthria, weakness, double vision, also feeling of being pulled to the right side. NIHSS 1 on arrival, not a tPA candidate. No prior history of stroke. · Consider TIA vs tiny brainstem stroke missed on MRI  · CT head, CTA head/neck negative for acute pathology  · MRI brain: No acute infarction, edema or pathologic intra-axial enhancement. · A1c: 5.0. Lipid panel: TGCs 170, HDL 38, LDL 64.  · Echo: EF 71%, normal systolic and diastolic function. · S/p 325 mg ASA, 300 mg Plavix x 1 in ED  · Neurology consult:  · Continue DAPT w/ ASA 81 mg and Plavix 75 mg daily x 21 days, followed by ASA monotherapy  · Decrease atorvastatin 40->10mg daily  · Follow-up with neurovascular outpatient in 4 weeks upon discharge  · Stable for discharge home    Alcohol abuse  Assessment & Plan  · Patient admits to every day alcohol use, drinks approximately 10 oz of whiskey daily  · Reports years ago having withdrawal symptoms with tremors, although none recently. Denies seizure history. ·  cessation    Thrombocytopenia (HCC)  Assessment & Plan  · Thrombocytopenia during admission  · Likely in setting of alcohol abuse  · Platelets overall stable, no bleeding  · Repeat CBC in 1 week    Hypomagnesemia  Assessment & Plan  · Mild hypomagnesemia during admission  · Repleted    Transaminitis  Assessment & Plan  Elevated LFTs POA: AST 49, alk phos 125, total bili 1.08. Also elevated INR 1.25, ethanol levels 103 POA. Ammonia levels wnl. · Hepatitis A, B, C: Nonreactive. HIV nonreactive. · KATY US: Hepatomegaly and hepatic steatosis. Slightly prominent common bile duct measuring 7 mm.  Consider evaluation with MRI abdomen with MRCP. · GI consult:  · MRI/MRCP without acute abnormalities. No common bile duct stone  · Suspect due to alcohol use    Medical Problems     Resolved Problems  Date Reviewed: 7/13/2023          Resolved    * (Principal) Stroke-like episode 7/13/2023     Resolved by  Honorio Daily PA-C        Discharging Physician / Practitioner: Honorio Daily PA-C  PCP: Dacia Eckert DO  Admission Date:   Admission Orders (From admission, onward)     Ordered        07/10/23 [address]  Once            07/10/23 1151  Inpatient Admission  Once                      Discharge Date: 07/13/23    Consultations During Hospital Stay:  · Neurology  · Gastroenterology  · Cardiology    Procedures Performed:   · None    Significant Findings / Test Results:   · CT head: No acute intracranial abnormality  · CTA head/neck: No flow-limiting cerebrovascular stenosis or occlusion in the neck. No flow-limiting cerebrovascular stenosis or occlusion in the head  · MRI brain: No acute infarction, edema, or pathologic intra-axial enhancement. Hypoplastic right transverse and sigmoid sinuses  · Right upper quadrant ultrasound: Hepatomegaly and hepatic steatosis. Slightly prominent common bile duct measuring 7 mm. Consider evaluation with MRI/MRCP. Nonobstructing right renal calculi  · MRI abdomen/MRCP: No gallstones, cholecystitis, biliary ductal dilatation or choledocholithiasis  · Echocardiogram: EF 67% normal diastolic function. No significant valvular disease. Incidental Findings:   · None    Test Results Pending at Discharge (will require follow up): · None     Outpatient Tests Requested:  · Holter monitor    Complications: None    Reason for Admission: Strokelike symptoms    Hospital Course:   January Francisco is a 47 y.o. male patient who originally presented to the hospital on 7/10/2023 due to strokelike symptoms.     Past medical history significant for hypertension, tobacco use, alcohol use. Presented to the emergency department with feeling off balance, left-sided weakness. Patient was stroke alert in the ED without acute findings on imaging. Was admitted under stroke pathway, loaded with ASA/Plavix. Neurology was consulted. Echocardiogram unremarkable. MRI negative for CVA. Concern for possible TIA versus missed brainstem CVA. Neurology recommended dual antiplatelet therapy x3 weeks then transition to ASA monotherapy. Recommended decrease dose of Lipitor due to elevated LFTs and otherwise controlled cholesterol panel. Gastroenterology was consulted due to elevated liver tests and concern for possible common bile duct dilatation on right upper quadrant ultrasound. MRI/MRCP was unremarkable. Felt slightly elevated liver enzymes related to known alcohol use. Cardiology arranged for outpatient Holter monitor per neurology recommendations. On day of discharge patient was at baseline, hemodynamically stable and verbalized understanding for requested outpatient follow-up. Please see above list of diagnoses and related plan for additional information. Condition at Discharge: stable    Discharge Day Visit / Exam:   Subjective: Denies any complaints. No events overnight. Vitals: Blood Pressure: 124/77 (07/13/23 0852)  Pulse: 75 (07/13/23 0721)  Temperature: 98.9 °F (37.2 °C) (07/13/23 0721)  Temp Source: Oral (07/11/23 0432)  Respirations: 16 (07/13/23 0721)  Height: 6' (182.9 cm) (07/11/23 0645)  Weight - Scale: 99.8 kg (220 lb) (07/11/23 0645)  SpO2: 93 % (07/13/23 0721)  Exam:   Physical Exam  Vitals and nursing note reviewed. Constitutional:       General: He is not in acute distress. Appearance: He is well-developed. Comments: No acute distress   HENT:      Head: Normocephalic and atraumatic. Eyes:      General: No scleral icterus. Extraocular Movements: Extraocular movements intact.       Conjunctiva/sclera: Conjunctivae normal.   Cardiovascular: Rate and Rhythm: Normal rate and regular rhythm. Heart sounds: No murmur heard. Pulmonary:      Effort: Pulmonary effort is normal. No respiratory distress. Breath sounds: Normal breath sounds. No wheezing, rhonchi or rales. Abdominal:      General: Bowel sounds are normal.      Palpations: Abdomen is soft. Tenderness: There is no abdominal tenderness. There is no guarding or rebound. Musculoskeletal:         General: No swelling. Cervical back: Normal range of motion. Comments: Able to move upper/lower extremities bilaterally, no edema   Skin:     General: Skin is warm and dry. Capillary Refill: Capillary refill takes less than 2 seconds. Neurological:      Mental Status: He is alert and oriented to person, place, and time. Psychiatric:         Mood and Affect: Mood normal.         Speech: Speech normal.         Behavior: Behavior normal.          Discussion with Family: Patient declined call to . Discharge instructions/Information to patient and family:   See after visit summary for information provided to patient and family. Provisions for Follow-Up Care:  See after visit summary for information related to follow-up care and any pertinent home health orders. Disposition:   Home    Planned Readmission: None     Discharge Statement:  I spent 60 minutes discharging the patient. This time was spent on the day of discharge. I had direct contact with the patient on the day of discharge. Greater than 50% of the total time was spent examining patient, answering all patient questions, arranging and discussing plan of care with patient as well as directly providing post-discharge instructions. Additional time then spent on discharge activities. Discharge Medications:  See after visit summary for reconciled discharge medications provided to patient and/or family.       **Please Note: This note may have been constructed using a voice recognition system**

## 2023-07-13 NOTE — APP STUDENT NOTE
MERCED STUDENT  Inpatient Progress Note for TRAINING ONLY  Not Part of Legal Medical Record     Progress Note - Tee Houser 47 y.o. male MRN: [id number]  Unit/Bed#: -Berta Encounter: [id number]    Assessment:    Principal Problem:    Stroke-like episode  Active Problems:    Transaminitis    Hypomagnesemia    Thrombocytopenia (HCC)    Alcohol abuse      Plan:    · Stroke-like episode  · Transaminitis  · Hypomagnesemia  · Thrombocytopenia  · Alcohol abuse  · Patient lives with his parents      VTE Pharmacologic Prophylaxis:   Pharmacologic: Enoxaparin (Lovenox)  Mechanical VTE Prophylaxis in Place: Yes, sequential compression devices in place. Patient Centered Rounds: I have performed bedside rounds with nursing staff today. Discussions with Specialists or Other Care Team Provider: Neurology, GI, case management    Education and Discussions with Family / Patient: Patient educated on plan, declined provider communication/update to emergency  or otherwise. Time Spent for Care: 30 minutes. More than 50% of total time spent on counseling and coordination of care as described above. Current Length of Stay: 3 day(s)    Current Patient Status: Inpatient   Certification Statement: The patient is anticipated to be discharged today pending GI confirmation. Discharge Plan: Anticipate discharge to home today    Code Status: Level 1 - Full Code    Subjective:   Patient seen at bedside this morning. In good spirits, slept well. Complaining of mild headache that feels like a band constricting around bilateral temples, forehead and eye areas. Patient experienced diarrhea on admission, last loose stool was two days ago. Has had 1 formed stool since then. Patient reports resolution of admitting neurological symptoms like double vision, weakness, slurred speech. Patient denies nausea, vomiting, dizziness, chest pain, shortness of breath, chills, sweating, shakiness/tremor, and hallucinations. Objective:     Vitals:   Temp (24hrs), Av.7 °F (37.1 °C), Min:98.5 °F (36.9 °C), Max:98.9 °F (37.2 °C)    Temp:  [98.5 °F (36.9 °C)-98.9 °F (37.2 °C)] 98.9 °F (37.2 °C)  HR:  [74-88] 75  Resp:  [14-18] 16  BP: (119-145)/(76-94) 124/77  SpO2:  [93 %-95 %] 93 %  Body mass index is 29.84 kg/m². Input and Output Summary (last 24 hours):        Intake/Output Summary (Last 24 hours) at 2023 1032  Last data filed at 2023 0909  Gross per 24 hour   Intake 600 ml   Output --   Net 600 ml       Physical Exam:     Physical Exam  ( *** Be Sure to Include Physical Exam: Delete this entire line when you have entered your exam)    Historical Information   Past Medical History:   Diagnosis Date   • Insomnia      Past Surgical History:   Procedure Laterality Date   • WISDOM TOOTH EXTRACTION       Social History   Social History     Substance and Sexual Activity   Alcohol Use Never     Social History     Substance and Sexual Activity   Drug Use No     Social History     Tobacco Use   Smoking Status Never   Smokeless Tobacco Current   • Types: Chew     Family History: {SERINA SCHMITT FAM HISTORY SMARTLIST:478576067}    Meds/Allergies   {SERINA SCHMITT H&P CWPC:[id number]}  No Known Allergies    Additional Data:     Labs:    Results from last 7 days   Lab Units 23  0541   WBC Thousand/uL 8.30   HEMOGLOBIN g/dL 13.0   HEMATOCRIT % 38.9   PLATELETS Thousands/uL 89*   NEUTROS PCT % 76*   LYMPHS PCT % 15   MONOS PCT % 6   EOS PCT % 3     Results from last 7 days   Lab Units 23  0541   SODIUM mmol/L 137   POTASSIUM mmol/L 3.8   CHLORIDE mmol/L 105   CO2 mmol/L 24   BUN mg/dL 13   CREATININE mg/dL 0.97   ANION GAP mmol/L 8   CALCIUM mg/dL 9.3   ALBUMIN g/dL 4.1   TOTAL BILIRUBIN mg/dL 2.04*   ALK PHOS U/L 137*   ALT U/L 20   AST U/L 40*   GLUCOSE RANDOM mg/dL 97     Results from last 7 days   Lab Units 23  0541   INR  1.30*     Results from last 7 days   Lab Units 23  0719 23  0709   POC GLUCOSE mg/dl 97 103 Results from last 7 days   Lab Units 07/10/23  1056   HEMOGLOBIN A1C % 5.0             * I Have Reviewed All Lab Data Listed Above. * Additional Pertinent Lab Tests Reviewed: {Labreview:24642}    Imaging:    Imaging Reports Reviewed Today Include: ***  Imaging Personally Reviewed by Myself Includes:  ***    Recent Cultures (last 7 days):           Last 24 Hours Medication List:   Current Facility-Administered Medications   Medication Dose Route Frequency Provider Last Rate   • acetaminophen  650 mg Oral Q6H PRN David Ortega PA-C     • aspirin  81 mg Oral Daily Neela Garcia MD     • atorvastatin  10 mg Oral QPM Braden Siddiqui PA-C     • chlorthalidone  25 mg Oral Daily Mitzi Harper PA-C     • clopidogrel  75 mg Oral Daily Dameon Burns PA-C     • enoxaparin  40 mg Subcutaneous Q24H [address] Mitzi Harper PA-C     • folic acid  1 mg Oral Daily Mitzi Harper PA-C     • loperamide  2 mg Oral 4x Daily PRN David Ortega PA-C     • losartan  100 mg Oral Daily Bernadette N RACHEL Fabian     • magnesium sulfate  2 g Intravenous Once Chava Gallegos PA-C 2 g (07/13/23 0845)   • metoprolol succinate  100 mg Oral Daily Mitzi Harper PA-C     • multivitamin-minerals  1 tablet Oral Daily Mitzi Harper PA-C     • nicotine  1 patch Transdermal Daily Neela Garcia MD     • rOPINIRole  0.5 mg Oral HS David Ortega PA-C     • thiamine  100 mg Oral Daily Mitzi Harper PA-C     • zolpidem  10 mg Oral HS PRN David Ortega PA-C          Today, Patient Was Seen By: Carlo Anderson    ** Please Note: Dictation voice to text software may have been used in the creation of this document.  **    .

## 2023-07-13 NOTE — CONSULTS
Consult - Cardiology   Adele Tello 47 y.o. male MRN: [id number]  Unit/Bed#: -01 Encounter: [id number]        Reason For Consult: Request for Holter monitor  Outpatient Cardiologist:               ASSESSMENT:  1. Possible TIA  2. Mild transaminitis possibly related to alcohol use  3. Thrombocytopenia platelet trend in the 80s and 90s  4. Holter monitor requested by neurology    PLAN/ DISCUSSION:     • Ambulatory monitor has been requested by neurology service. I have ordered a 1 week ZIO monitor which will be mailed directly to his home. I will follow-up on the results with him over the phone. If results are normal he could probably just follow-up with PCP and neurology thereafter, unless neurology would like further ambulatory arrhythmia monitoring. Fine for DC from our standpoint. History Of Present Illness: This is a pleasant 71-year-old gentleman currently hospitalized with strokelike symptoms. Stroke work-up including an MRI has been negative. He has been diagnosed with a possible TIA. Neurology recommended a Holter monitor for which reason we have been asked to see him in consultation. Past Medical History:        Past Medical History:   Diagnosis Date   • Insomnia       Past Surgical History:   Procedure Laterality Date   • WISDOM TOOTH EXTRACTION          Allergy:        No Known Allergies    Medications:       Prior to Admission medications    Medication Sig Start Date End Date Taking?  Authorizing Provider   amLODIPine (Norvasc) 10 mg tablet Take 10 mg by mouth daily   Yes Historical Provider, MD   chlorthalidone 25 mg tablet Take 25 mg by mouth daily   Yes Historical Provider, MD   FAMOTIDINE PO Take 20 mg by mouth in the morning   Yes Historical Provider, MD   loperamide (IMODIUM) 2 mg capsule Take 2 mg by mouth 4 (four) times a day as needed for diarrhea   Yes Historical Provider, MD   losartan (COZAAR) 100 MG tablet Take 100 mg by mouth 2 (two) times a day   Yes Historical Provider, MD metoprolol succinate (TOPROL-XL) 100 mg 24 hr tablet Take 100 mg by mouth daily   Yes Historical Provider, MD   rOPINIRole (REQUIP) 0.5 mg tablet Take 0.5 mg by mouth daily at bedtime   Yes Historical Provider, MD   zolpidem (AMBIEN CR) 12.5 MG CR tablet Take 10 mg by mouth daily at bedtime as needed for sleep   Yes Historical Provider, MD   hydrOXYzine HCL (ATARAX) 25 mg tablet Take 25 mg by mouth every 6 (six) hours as needed for anxiety    Historical Provider, MD   ibuprofen (MOTRIN) 600 mg tablet Take 1 tablet by mouth every 6 (six) hours as needed for mild pain or moderate pain 10/2/17   Florinda Henriquez DO       Family History:     History reviewed. No pertinent family history. Social History:       Social History     Socioeconomic History   • Marital status: /Civil Union     Spouse name: None   • Number of children: None   • Years of education: None   • Highest education level: None   Occupational History   • None   Tobacco Use   • Smoking status: Never   • Smokeless tobacco: Current     Types: Chew   Vaping Use   • Vaping Use: Former   Substance and Sexual Activity   • Alcohol use: Never   • Drug use: No   • Sexual activity: None   Other Topics Concern   • None   Social History Narrative   • None     Social Determinants of Health     Financial Resource Strain: Not on file   Food Insecurity: No Food Insecurity (7/11/2023)    Hunger Vital Sign    • Worried About Running Out of Food in the Last Year: Never true    • Ran Out of Food in the Last Year: Never true   Transportation Needs: No Transportation Needs (7/11/2023)    PRAPARE - Transportation    • Lack of Transportation (Medical): No    • Lack of Transportation (Non-Medical):  No   Physical Activity: Not on file   Stress: Not on file   Social Connections: Not on file   Intimate Partner Violence: Not on file   Housing Stability: Low Risk  (7/11/2023)    Housing Stability Vital Sign    • Unable to Pay for Housing in the Last Year: No    • Number of Places Lived in the Last Year: 1    • Unstable Housing in the Last Year: No       ROS:  14 point ROS negative except as outlined above  Remainder review of systems is negative    Exam:  General:  alert, oriented and in no distress, cooperative  Head: Normocephalic, atraumatic. Eyes:  EOMI. Pupils - equal, round, reactive to accomodation. No icterus. Normal Conjunctiva. Oropharynx: moist and normal-appearing mucosa  Neck: supple, symmetrical, trachea midline and no JVD  Heart:  RRR, No: murmer, rub or gallop, S1 & S2 normal   Respiratory effort / Chest Inspection: unlabored  Lungs:  normal air entry, lungs clear to auscultation and no rales, rhonchi or wheezing   Abdomen: flat, normal findings: bowel sounds normal and soft, non-tender  Lower Limbs:  no pitting edema  Pulses[de-identified]  RLE - DP: present 2+                 LLE - DP: present 2+  Musculoskeletal: ROM grossly normal        DATA:      ECG:                     Telemetry: No longer on telemetry          Echocardiogram:           Ischemic Testing:         Weights: Wt Readings from Last 3 Encounters:   07/11/23 99.8 kg (220 lb)   10/02/17 99.8 kg (220 lb)   , Body mass index is 29.84 kg/m².          Lab Studies:           Results from last 7 days   Lab Units 07/10/23  1056   TRIGLYCERIDES mg/dL 170*   HDL mg/dL 38*     Results from last 7 days   Lab Units 07/13/23  0541 07/12/23  0850 07/11/23  0924   WBC Thousand/uL 8.30 7.00 7.38   HEMOGLOBIN g/dL 13.0 11.9* 12.0   HEMATOCRIT % 38.9 36.4* 37.1   PLATELETS Thousands/uL 89* 81* 90*   ,   Results from last 7 days   Lab Units 07/13/23  0541 07/12/23  0511 07/11/23  0924   POTASSIUM mmol/L 3.8 3.9 3.7   CHLORIDE mmol/L 105 104 103   CO2 mmol/L 24 24 25   BUN mg/dL 13 14 14   CREATININE mg/dL 0.97 1.00 1.12   CALCIUM mg/dL 9.3 8.9 9.2   ALK PHOS U/L 137* 132* 138*   ALT U/L 20 18 21   AST U/L 40* 35 42*

## 2023-07-13 NOTE — ASSESSMENT & PLAN NOTE
· Thrombocytopenia during admission  · Likely in setting of alcohol abuse  · Platelets overall stable, no bleeding  · Repeat CBC in 1 week

## 2023-07-13 NOTE — PLAN OF CARE
Problem: PAIN - ADULT  Goal: Verbalizes/displays adequate comfort level or baseline comfort level  Description: Interventions:  - Encourage patient to monitor pain and request assistance  - Assess pain using appropriate pain scale  - Administer analgesics based on type and severity of pain and evaluate response  - Implement non-pharmacological measures as appropriate and evaluate response  - Consider cultural and social influences on pain and pain management  - Notify physician/advanced practitioner if interventions unsuccessful or patient reports new pain  Outcome: Progressing     Problem: INFECTION - ADULT  Goal: Absence or prevention of progression during hospitalization  Description: INTERVENTIONS:  - Assess and monitor for signs and symptoms of infection  - Monitor lab/diagnostic results  - Monitor all insertion sites, i.e. indwelling lines, tubes, and drains  - Monitor endotracheal if appropriate and nasal secretions for changes in amount and color  - [place] appropriate cooling/warming therapies per order  - Administer medications as ordered  - Instruct and encourage patient and family to use good hand hygiene technique  - Identify and instruct in appropriate isolation precautions for identified infection/condition  Outcome: Progressing  Goal: Absence of fever/infection during neutropenic period  Description: INTERVENTIONS:  - Monitor WBC    Outcome: Progressing     Problem: SAFETY ADULT  Goal: Patient will remain free of falls  Description: INTERVENTIONS:  - Educate patient/family on patient safety including physical limitations  - Instruct patient to call for assistance with activity   - Consult OT/PT to assist with strengthening/mobility   - Keep Call bell within reach  - Keep bed low and locked with side rails adjusted as appropriate  - Keep care items and personal belongings within reach  - Initiate and maintain comfort rounds  - Make Fall Risk Sign visible to staff  - Offer Toileting every 2 Hours, in advance of need  - Initiate/Maintain bed alarm  - Obtain necessary fall risk management equipment:   - Apply yellow socks and bracelet for high fall risk patients  - Consider moving patient to room near nurses station  Outcome: Progressing  Goal: Maintain or return to baseline ADL function  Description: INTERVENTIONS:  -  Assess patient's ability to carry out ADLs; assess patient's baseline for ADL function and identify physical deficits which impact ability to perform ADLs (bathing, care of mouth/teeth, toileting, grooming, dressing, etc.)  - Assess/evaluate cause of self-care deficits   - Assess range of motion  - Assess patient's mobility; develop plan if impaired  - Assess patient's need for assistive devices and provide as appropriate  - Encourage maximum independence but intervene and supervise when necessary  - Involve family in performance of ADLs  - Assess for home care needs following discharge   - Consider OT consult to assist with ADL evaluation and planning for discharge  - Provide patient education as appropriate  Outcome: Progressing  Goal: Maintains/Returns to pre admission functional level  Description: INTERVENTIONS:  - Perform BMAT or MOVE assessment daily.   - Set and communicate daily mobility goal to care team and patient/family/caregiver. - Collaborate with rehabilitation services on mobility goals if consulted  - Perform Range of Motion 3 times a day. - Reposition patient every 3 hours.   - Dangle patient 3 times a day  - Stand patient 3 times a day  - Ambulate patient 3 times a day  - Out of bed to chair 3 times a day   - Out of bed for meals 3 times a day  - Out of bed for toileting  - Record patient progress and toleration of activity level   Outcome: Progressing     Problem: DISCHARGE PLANNING  Goal: Discharge to home or other facility with appropriate resources  Description: INTERVENTIONS:  - Identify barriers to discharge w/patient and caregiver  - Arrange for needed discharge resources and transportation as appropriate  - Identify discharge learning needs (meds, wound care, etc.)  - Arrange for interpretive services to assist at discharge as needed  - Refer to Case Management Department for coordinating discharge planning if the patient needs post-hospital services based on physician/advanced practitioner order or complex needs related to functional status, cognitive ability, or social support system  Outcome: Progressing     Problem: Knowledge Deficit  Goal: Patient/family/caregiver demonstrates understanding of disease process, treatment plan, medications, and discharge instructions  Description: Complete learning assessment and assess knowledge base.   Interventions:  - Provide teaching at level of understanding  - Provide teaching via preferred learning methods  Outcome: Progressing     Problem: NEUROSENSORY - ADULT  Goal: Achieves stable or improved neurological status  Description: INTERVENTIONS  - Monitor and report changes in neurological status  - Monitor vital signs such as temperature, blood pressure, glucose, and any other labs ordered   - Initiate measures to prevent increased intracranial pressure  - Monitor for seizure activity and implement precautions if appropriate      Outcome: Progressing  Goal: Remains free of injury related to seizures activity  Description: INTERVENTIONS  - Maintain airway, patient safety  and administer oxygen as ordered  - Monitor patient for seizure activity, document and report duration and description of seizure to physician/advanced practitioner  - If seizure occurs,  ensure patient safety during seizure  - Reorient patient post seizure  - Seizure pads on all 4 side rails  - Instruct patient/family to notify RN of any seizure activity including if an aura is experienced  - Instruct patient/family to call for assistance with activity based on nursing assessment  - Administer anti-seizure medications if ordered    Outcome: Progressing  Goal: Achieves maximal functionality and self care  Description: INTERVENTIONS  - Monitor swallowing and airway patency with patient fatigue and changes in neurological status  - Encourage and assist patient to increase activity and self care. - Encourage visually impaired, hearing impaired and aphasic patients to use assistive/communication devices  Outcome: Progressing     Problem: MOBILITY - ADULT  Goal: Maintain or return to baseline ADL function  Description: INTERVENTIONS:  -  Assess patient's ability to carry out ADLs; assess patient's baseline for ADL function and identify physical deficits which impact ability to perform ADLs (bathing, care of mouth/teeth, toileting, grooming, dressing, etc.)  - Assess/evaluate cause of self-care deficits   - Assess range of motion  - Assess patient's mobility; develop plan if impaired  - Assess patient's need for assistive devices and provide as appropriate  - Encourage maximum independence but intervene and supervise when necessary  - Involve family in performance of ADLs  - Assess for home care needs following discharge   - Consider OT consult to assist with ADL evaluation and planning for discharge  - Provide patient education as appropriate  Outcome: Progressing  Goal: Maintains/Returns to pre admission functional level  Description: INTERVENTIONS:  - Perform BMAT or MOVE assessment daily.   - Set and communicate daily mobility goal to care team and patient/family/caregiver. - Collaborate with rehabilitation services on mobility goals if consulted  - Perform Range of Motion 3 times a day. - Reposition patient every 3 hours.   - Dangle patient 3 times a day  - Stand patient 3 times a day  - Ambulate patient 3 times a day  - Out of bed to chair 3 times a day   - Out of bed for meals 3 times a day  - Out of bed for toileting  - Record patient progress and toleration of activity level   Outcome: Progressing     Problem: Neurological Deficit  Goal: Neurological status is stable or improving  Description: Interventions:  - Monitor and assess patient's level of consciousness, motor function, sensory function, and level of assistance needed for ADLs. - Monitor and report changes from baseline. Collaborate with interdisciplinary team to initiate plan and implement interventions as ordered. - Provide and maintain a safe environment. - Consider seizure precautions. - Consider fall precautions. - Consider aspiration precautions. - Consider bleeding precautions. Outcome: Progressing     Problem: Activity Intolerance/Impaired Mobility  Goal: Mobility/activity is maintained at optimum level for patient  Description: Interventions:  - Assess and monitor patient  barriers to mobility and need for assistive/adaptive devices. - Assess patient's emotional response to limitations. - Collaborate with interdisciplinary team and initiate plans and interventions as ordered. - Encourage independent activity per ability.  - Maintain proper body alignment. - Perform active/passive rom as tolerated/ordered. - Plan activities to conserve energy.  - Turn patient as appropriate  Outcome: Progressing     Problem: Communication Impairment  Goal: Ability to express needs and understand communication  Description: Assess patient's communication skills and ability to understand information. Patient will demonstrate use of effective communication techniques, alternative methods of communication and understanding even if not able to speak. - Encourage communication and provide alternate methods of communication as needed. - Collaborate with case management/ for discharge needs. - Include patient/family/caregiver in decisions related to communication. Outcome: Progressing     Problem: Potential for Aspiration  Goal: Non-ventilated patient's risk of aspiration is minimized  Description: Assess and monitor vital signs, respiratory status, and labs (WBC). Monitor for signs of aspiration (tachypnea, cough, rales, wheezing, cyanosis, fever). - Assess and monitor patient's ability to swallow. - Place patient up in chair to eat if possible. - HOB up at 90 degrees to eat if unable to get patient up into chair.  - Supervise patient during oral intake. - Instruct patient/ family to take small bites. - Instruct patient/ family to take small single sips when taking liquids. - Follow patient-specific strategies generated by speech pathologist.  Outcome: Progressing  Goal: Ventilated patient's risk of aspiration is minimized  Description: Assess and monitor vital signs, respiratory status, airway cuff pressure, and labs (WBC). Monitor for signs of aspiration (tachypnea, cough, rales, wheezing, cyanosis, fever). - Elevate head of bed 30 degrees if patient has tube feeding.  - Monitor tube feeding. Outcome: Progressing     Problem: Nutrition  Goal: Nutrition/Hydration status is improving  Description: Monitor and assess patient's nutrition/hydration status for malnutrition (ex- brittle hair, bruises, dry skin, pale skin and conjunctiva, muscle wasting, smooth red tongue, and disorientation). Collaborate with interdisciplinary team and initiate plan and interventions as ordered. Monitor patient's weight and dietary intake as ordered or per policy. Utilize nutrition screening tool and intervene per policy. Determine patient's food preferences and provide high-protein, high-caloric foods as appropriate. - Assist patient with eating.  - Allow adequate time for meals.  - Encourage patient to take dietary supplement as ordered. - Collaborate with clinical nutritionist.  - Include patient/family/caregiver in decisions related to nutrition.   Outcome: Progressing

## 2023-07-13 NOTE — CASE MANAGEMENT
Case Management Progress Note    Patient name Gonzalo Sheriff  Location [address] [facility] 333/-[id number] MRN [id number]  : 1970 Date 2023       LOS (days): 3  Geometric Mean LOS (GMLOS) (days):   Days to GMLOS:        OBJECTIVE:     Current admission status: Inpatient  Preferred Pharmacy:   [id number]  Phone: [phone] Fax: [phone]    Primary Care Provider: Simin Garcia DO    Primary Insurance: Hiro Montgomery  Secondary Insurance:     PROGRESS NOTE:    Notification made to OP CM Handoff: [facility] OP CM regarding discharge planning and disposition.

## 2023-07-13 NOTE — UTILIZATION REVIEW
NOTIFICATION OF ADMISSION DISCHARGE   This is a Notification of Discharge from [facility]. Please be advised that this patient has been discharge from our facility. Below you will find the admission and discharge date and time including the patient’s disposition. UTILIZATION REVIEW CONTACT:  Shiraz Bennett  Utilization   Network Utilization Review Department  Phone: [phone] x carefully listen to the prompts. All voicemails are confidential.  Email: [email or web address]     ADMISSION INFORMATION  PRESENTATION DATE: 7/10/2023 10:51 AM  OBERVATION ADMISSION DATE:   INPATIENT ADMISSION DATE: 7/10/23 11:51 AM   DISCHARGE DATE: 7/13/2023  2:55 PM   DISPOSITION:Home/Self Care    IMPORTANT INFORMATION:  Send all requests for admission clinical reviews, approved or denied determinations and any other requests to dedicated fax number below belonging to the campus where the patient is receiving treatment.  List of dedicated fax numbers:  [place] DENIALS (Administrative/Medical Necessity) [phone] [address] [facility] (Maternity/NICU/Pediatrics) [phone]   [facility] [phone]   [facility] [phone] [address] [phone]   [address] [place] [address] [phone]   Health system [phone]   [address] [place] [address] [address] [facility] [phone]   [address] [facility] [phone] [address] [facility] [phone] [address] [facility] [address] [facility] [phone]

## 2023-07-13 NOTE — DISCHARGE INSTR - AVS FIRST PAGE
Follow-up with PCP within 1 week for posthospitalization follow-up  Follow-up with neurology as outpatient  Continue aspirin and Plavix for 3 weeks. After the 3 weeks, discontinue Plavix and continue aspirin. Continue Lipitor  Repeat CBC in 1 week to follow-up platelet count  Cardiology will assist with outpatient holter monitor    Return to the emergency department for further evaluation with any chest pain/palpitations, shortness of breath, nausea/vomiting, abdominal pain, fever/chills, weakness, numbness/tingling, visual changes, speech difficulty.

## 2023-07-13 NOTE — ASSESSMENT & PLAN NOTE
· Patient admits to every day alcohol use, drinks approximately 10 oz of whiskey daily  · Reports years ago having withdrawal symptoms with tremors, although none recently. Denies seizure history.   ·  cessation

## 2023-07-19 ENCOUNTER — APPOINTMENT (OUTPATIENT)
Dept: LAB | Facility: HOSPITAL | Age: 53
End: 2023-07-19
Payer: COMMERCIAL

## 2023-07-19 DIAGNOSIS — D69.6 THROMBOCYTOPENIA (HCC): ICD-10-CM

## 2023-07-19 LAB
BASOPHILS # BLD AUTO: 0.06 THOUSANDS/ÂΜL (ref 0–0.1)
BASOPHILS NFR BLD AUTO: 1 % (ref 0–1)
EOSINOPHIL # BLD AUTO: 0.2 THOUSAND/ÂΜL (ref 0–0.61)
EOSINOPHIL NFR BLD AUTO: 3 % (ref 0–6)
ERYTHROCYTE [DISTWIDTH] IN BLOOD BY AUTOMATED COUNT: 14.2 % (ref 11.6–15.1)
HCT VFR BLD AUTO: 35.8 % (ref 36.5–49.3)
HGB BLD-MCNC: 11.6 G/DL (ref 12–17)
IMM GRANULOCYTES # BLD AUTO: 0.03 THOUSAND/UL (ref 0–0.2)
IMM GRANULOCYTES NFR BLD AUTO: 0 % (ref 0–2)
LYMPHOCYTES # BLD AUTO: 1 THOUSANDS/ÂΜL (ref 0.6–4.47)
LYMPHOCYTES NFR BLD AUTO: 13 % (ref 14–44)
MCH RBC QN AUTO: 29.4 PG (ref 26.8–34.3)
MCHC RBC AUTO-ENTMCNC: 32.4 G/DL (ref 31.4–37.4)
MCV RBC AUTO: 91 FL (ref 82–98)
MONOCYTES # BLD AUTO: 0.78 THOUSAND/ÂΜL (ref 0.17–1.22)
MONOCYTES NFR BLD AUTO: 10 % (ref 4–12)
NEUTROPHILS # BLD AUTO: 5.43 THOUSANDS/ÂΜL (ref 1.85–7.62)
NEUTS SEG NFR BLD AUTO: 73 % (ref 43–75)
NRBC BLD AUTO-RTO: 0 /100 WBCS
PLATELET # BLD AUTO: 116 THOUSANDS/UL (ref 149–390)
PMV BLD AUTO: 11.8 FL (ref 8.9–12.7)
RBC # BLD AUTO: 3.94 MILLION/UL (ref 3.88–5.62)
WBC # BLD AUTO: 7.5 THOUSAND/UL (ref 4.31–10.16)

## 2023-07-19 PROCEDURE — 85025 COMPLETE CBC W/AUTO DIFF WBC: CPT

## 2023-07-19 PROCEDURE — 36415 COLL VENOUS BLD VENIPUNCTURE: CPT

## 2023-08-08 ENCOUNTER — CLINICAL SUPPORT (OUTPATIENT)
Dept: CARDIOLOGY CLINIC | Facility: CLINIC | Age: 53
End: 2023-08-08
Payer: COMMERCIAL

## 2023-08-08 DIAGNOSIS — R29.898 LEFT ARM WEAKNESS: ICD-10-CM

## 2023-08-08 DIAGNOSIS — R29.90 STROKE-LIKE EPISODE: ICD-10-CM

## 2023-08-08 PROCEDURE — 93244 EXT ECG>48HR<7D REV&INTERPJ: CPT | Performed by: PHYSICIAN ASSISTANT

## 2023-08-10 NOTE — RESULT ENCOUNTER NOTE
Reviewed results of ZIO monitor. This was fairly unremarkable. There were no dangerous arrhythmias. Just a few extra heart beats which are common and benign. Based on his heart monitor would not recommend changing any medications. He should follow-up with neurology as scheduled. He does have an appointment with me in early September but if he would prefer to hold off on this appointment and see neurology first that would be fine. I did try calling him this morning to relay this but did not get an answer. If he calls back could you please relay the above?   Thank you

## 2023-08-31 ENCOUNTER — TELEPHONE (OUTPATIENT)
Dept: NEUROLOGY | Facility: CLINIC | Age: 53
End: 2023-08-31

## 2023-09-01 PROBLEM — G45.9 TIA (TRANSIENT ISCHEMIC ATTACK): Status: ACTIVE | Noted: 2023-09-01

## 2023-09-01 NOTE — PROGRESS NOTES
Cardiology Office Follow Up  Eva Miller  1970  [id number]      ASSESSMENT:  1. Hypertension  2. Family history of coronary artery disease in first-degree relative (father)  a. 7/2023 lipids: Cholesterol 136, triglycerides 170, HDL 38, LDL 64  3. Tobacco use, chewing tobacco 2 tins per week  a. Previously had used up to 4 tins weekly and has since cut this in half by 50% with the help of a nicotine patch, which is excellent and moving in the right direction though complete cessation is advised  4. History of mild thrombocytopenia  a. July 2023 platelet count trending around   5. Possible TIA in July 2023  a. 8/2023 ZIO 1 week: no atrial fibrillation or flutter, no sustained arrhythmias  b. 7/2023 Echo: LVEF 65%, mild left atrial dilation, mild mitral insufficiency    PLAN/ DISCUSSION:  • From a cardiac standpoint our primary focus will be preventative care in this gentleman with a first-degree family history of coronary artery disease as well as CVA in first-degree relative. His blood pressure today is acceptable on chlorthalidone, Toprol-XL, and losartan so we will continue all of the same. He remains on aspirin with history of recent TIA. His LDL is under excellent control as of July 2023 at which time he was not on any medications. He was placed on Lipitor after his TIA but has since discontinued. Will defer to neurology on length of treatment. • Overall I have made no changes to his medical regimen today  • He has follow-up with neurology later this month  • We will arrange 6-month follow-up  • Exercise regiment discussed and encouraged as well as tobacco cessation    Interval History/ HPI:   Rio Egan is a 59-year-old gentleman hospitalized earlier this summer with a possible TIA. He was seen by neurology at that time. He was recommended an outpatient Holter monitor which we arranged as above. He is here today for posthospital follow-up. Today he comes to the office generally feeling well.   He has no acute complaints. He reports that his father is currently hospitalized and it sounds like his to undergo coronary stenting in the next day or 2. He continues to work full-time at [employer]. He spends 8 hours on his feet at a time. He has no chest pain or chest pressure. He has shortness of breath which is chronic and stable. He is having no dizziness or palpitations. He has had no episodes of passing out. He does use chewing tobacco about 2 tins per week however has been trying to cut down and is using a nicotine patch. He used to chew 4 tins weekly so is down about 50%. This is excellent and moving in the right direction. Vitals:  /80 (BP Location: Left arm, Patient Position: Sitting, Cuff Size: Standard)   Pulse 79   Ht 6' (1.829 m)   Wt 102 kg (225 lb)   BMI 30.52 kg/m²      Past Medical History:   Diagnosis Date   • Insomnia      Social History     Socioeconomic History   • Marital status: /Civil Union     Spouse name: Not on file   • Number of children: Not on file   • Years of education: Not on file   • Highest education level: Not on file   Occupational History   • Not on file   Tobacco Use   • Smoking status: Never   • Smokeless tobacco: Current     Types: Chew   Vaping Use   • Vaping Use: Former   Substance and Sexual Activity   • Alcohol use: Never   • Drug use: No   • Sexual activity: Not on file   Other Topics Concern   • Not on file   Social History Narrative   • Not on file     Social Determinants of Health     Financial Resource Strain: Not on file   Food Insecurity: No Food Insecurity (7/11/2023)    Hunger Vital Sign    • Worried About Running Out of Food in the Last Year: Never true    • Ran Out of Food in the Last Year: Never true   Transportation Needs: No Transportation Needs (7/11/2023)    PRAPARE - Transportation    • Lack of Transportation (Medical): No    • Lack of Transportation (Non-Medical):  No   Physical Activity: Not on file   Stress: Not on file Social Connections: Not on file   Intimate Partner Violence: Not on file   Housing Stability: Low Risk  (7/11/2023)    Housing Stability Vital Sign    • Unable to Pay for Housing in the Last Year: No    • Number of Places Lived in the Last Year: 1    • Unstable Housing in the Last Year: No      No family history on file. Past Surgical History:   Procedure Laterality Date   • WISDOM TOOTH EXTRACTION         Current Outpatient Medications:   •  aspirin 81 mg chewable tablet, Chew 1 tablet (81 mg total) daily Do not start before July 14, 2023., Disp: 90 tablet, Rfl: 0  •  atorvastatin (LIPITOR) 10 mg tablet, Take 1 tablet (10 mg total) by mouth every evening, Disp: 30 tablet, Rfl: 0  •  chlorthalidone 25 mg tablet, Take 25 mg by mouth daily, Disp: , Rfl:   •  clopidogrel (PLAVIX) 75 mg tablet, Take 1 tablet (75 mg total) by mouth daily Do not start before July 14, 2023., Disp: 21 tablet, Rfl: 0  •  FAMOTIDINE PO, Take 20 mg by mouth in the morning, Disp: , Rfl:   •  hydrOXYzine HCL (ATARAX) 25 mg tablet, Take 25 mg by mouth every 6 (six) hours as needed for anxiety, Disp: , Rfl:   •  loperamide (IMODIUM) 2 mg capsule, Take 2 mg by mouth 4 (four) times a day as needed for diarrhea, Disp: , Rfl:   •  losartan (COZAAR) 100 MG tablet, Take 100 mg by mouth 2 (two) times a day, Disp: , Rfl:   •  metoprolol succinate (TOPROL-XL) 100 mg 24 hr tablet, Take 100 mg by mouth daily, Disp: , Rfl:   •  rOPINIRole (REQUIP) 0.5 mg tablet, Take 0.5 mg by mouth daily at bedtime, Disp: , Rfl:   •  zolpidem (AMBIEN CR) 12.5 MG CR tablet, Take 10 mg by mouth daily at bedtime as needed for sleep, Disp: , Rfl:       Review of Systems:  Review of Systems   Constitutional: Negative for chills and fever. HENT: Negative for ear pain and sore throat. Eyes: Negative for pain and visual disturbance. Respiratory: Negative for cough and shortness of breath. Cardiovascular: Negative for chest pain and palpitations.    Gastrointestinal: Negative for abdominal pain and vomiting. Genitourinary: Negative for dysuria and hematuria. Musculoskeletal: Negative for arthralgias and back pain. Skin: Negative for color change and rash. Neurological: Negative for seizures and syncope. All other systems reviewed and are negative. Physical Exam:  Physical Exam  Constitutional:       Appearance: He is well-developed. HENT:      Head: Normocephalic and atraumatic. Eyes:      Pupils: Pupils are equal, round, and reactive to light. Cardiovascular:      Rate and Rhythm: Normal rate and regular rhythm. Heart sounds: No murmur heard. No friction rub. No gallop. Pulmonary:      Effort: Pulmonary effort is normal. No respiratory distress. Breath sounds: Normal breath sounds. No wheezing or rales. Abdominal:      General: Bowel sounds are normal. There is no distension. Palpations: Abdomen is soft. Tenderness: There is no abdominal tenderness. Musculoskeletal:         General: No deformity. Normal range of motion. Cervical back: Normal range of motion and neck supple. Skin:     General: Skin is warm and dry. Neurological:      Mental Status: He is alert and oriented to person, place, and time. Psychiatric:         Behavior: Behavior normal.         This note was completed in part utilizing [employer] Software. Grammatical errors, random word insertions, spelling mistakes, and incomplete sentences can be an occasional consequence of this system secondary to software limitations, ambient noise, and hardware issues. If you have any questions or concerns about the content, text, or information contained within the body of this dictation, please contact the provider for clarification.

## 2023-09-05 ENCOUNTER — OFFICE VISIT (OUTPATIENT)
Dept: CARDIOLOGY CLINIC | Facility: CLINIC | Age: 53
End: 2023-09-05

## 2023-09-05 VITALS
DIASTOLIC BLOOD PRESSURE: 80 MMHG | HEART RATE: 79 BPM | SYSTOLIC BLOOD PRESSURE: 132 MMHG | HEIGHT: 72 IN | WEIGHT: 225 LBS | BODY MASS INDEX: 30.48 KG/M2

## 2023-09-05 DIAGNOSIS — G45.9 TIA (TRANSIENT ISCHEMIC ATTACK): Primary | ICD-10-CM

## 2023-09-05 NOTE — PATIENT INSTRUCTIONS
No changes to medications today, please continue everything the same. If you need refills of your cardiac medications prescribed by our office, please call us ahead of time so that they can be filled. We will see you back in the office in 6 months. If you have any questions or concerns in the mean time please do not hesitate to call our office.

## 2024-02-26 ENCOUNTER — HOSPITAL ENCOUNTER (EMERGENCY)
Facility: HOSPITAL | Age: 54
Discharge: HOME | End: 2024-02-26
Attending: EMERGENCY MEDICINE
Payer: COMMERCIAL

## 2024-02-26 ENCOUNTER — APPOINTMENT (OUTPATIENT)
Dept: RADIOLOGY | Facility: HOSPITAL | Age: 54
End: 2024-02-26
Payer: COMMERCIAL

## 2024-02-26 ENCOUNTER — APPOINTMENT (EMERGENCY)
Dept: CT IMAGING | Facility: HOSPITAL | Age: 54
End: 2024-02-26
Payer: COMMERCIAL

## 2024-02-26 VITALS
OXYGEN SATURATION: 97 % | HEART RATE: 93 BPM | TEMPERATURE: 97.6 F | DIASTOLIC BLOOD PRESSURE: 89 MMHG | RESPIRATION RATE: 16 BRPM | SYSTOLIC BLOOD PRESSURE: 170 MMHG

## 2024-02-26 DIAGNOSIS — N23 RENAL COLIC ON LEFT SIDE: ICD-10-CM

## 2024-02-26 DIAGNOSIS — N20.1 LEFT URETERAL STONE: Primary | ICD-10-CM

## 2024-02-26 LAB
2HR DELTA HS TROPONIN: 1 NG/L
ALBUMIN SERPL BCP-MCNC: 3.5 G/DL (ref 3.5–5)
ALP SERPL-CCNC: 125 U/L (ref 34–104)
ALT SERPL W P-5'-P-CCNC: 14 U/L (ref 7–52)
AMORPH URATE CRY URNS QL MICRO: ABNORMAL
ANION GAP SERPL CALCULATED.3IONS-SCNC: 7 MMOL/L
AST SERPL W P-5'-P-CCNC: 28 U/L (ref 13–39)
ATRIAL RATE: 74 BPM
BACTERIA UR QL AUTO: ABNORMAL /HPF
BASOPHILS # BLD AUTO: 0.02 THOUSANDS/ÂΜL (ref 0–0.1)
BASOPHILS NFR BLD AUTO: 0 % (ref 0–1)
BILIRUB SERPL-MCNC: 0.83 MG/DL (ref 0.2–1)
BILIRUB UR QL STRIP: NEGATIVE
BUN SERPL-MCNC: 12 MG/DL (ref 5–25)
CALCIUM SERPL-MCNC: 8.7 MG/DL (ref 8.4–10.2)
CAOX CRY URNS QL MICRO: ABNORMAL /HPF
CARDIAC TROPONIN I PNL SERPL HS: 4 NG/L
CARDIAC TROPONIN I PNL SERPL HS: 5 NG/L
CHLORIDE SERPL-SCNC: 106 MMOL/L (ref 96–108)
CLARITY UR: ABNORMAL
CO2 SERPL-SCNC: 24 MMOL/L (ref 21–32)
COLOR UR: ABNORMAL
CREAT SERPL-MCNC: 1.23 MG/DL (ref 0.6–1.3)
EOSINOPHIL # BLD AUTO: 0.06 THOUSAND/ÂΜL (ref 0–0.61)
EOSINOPHIL NFR BLD AUTO: 1 % (ref 0–6)
ERYTHROCYTE [DISTWIDTH] IN BLOOD BY AUTOMATED COUNT: 16.4 % (ref 11.6–15.1)
GFR SERPL CREATININE-BSD FRML MDRD: 66 ML/MIN/1.73SQ M
GLUCOSE SERPL-MCNC: 149 MG/DL (ref 65–140)
GLUCOSE UR STRIP-MCNC: NEGATIVE MG/DL
HCT VFR BLD AUTO: 38 % (ref 36.5–49.3)
HGB BLD-MCNC: 11.8 G/DL (ref 12–17)
HGB UR QL STRIP.AUTO: ABNORMAL
IMM GRANULOCYTES # BLD AUTO: 0.07 THOUSAND/UL (ref 0–0.2)
IMM GRANULOCYTES NFR BLD AUTO: 1 % (ref 0–2)
KETONES UR STRIP-MCNC: ABNORMAL MG/DL
LEUKOCYTE ESTERASE UR QL STRIP: ABNORMAL
LYMPHOCYTES # BLD AUTO: 0.89 THOUSANDS/ÂΜL (ref 0.6–4.47)
LYMPHOCYTES NFR BLD AUTO: 9 % (ref 14–44)
MCH RBC QN AUTO: 25.3 PG (ref 26.8–34.3)
MCHC RBC AUTO-ENTMCNC: 31.1 G/DL (ref 31.4–37.4)
MCV RBC AUTO: 81 FL (ref 82–98)
MONOCYTES # BLD AUTO: 0.57 THOUSAND/ÂΜL (ref 0.17–1.22)
MONOCYTES NFR BLD AUTO: 6 % (ref 4–12)
NEUTROPHILS # BLD AUTO: 8.07 THOUSANDS/ÂΜL (ref 1.85–7.62)
NEUTS SEG NFR BLD AUTO: 83 % (ref 43–75)
NITRITE UR QL STRIP: NEGATIVE
NON-SQ EPI CELLS URNS QL MICRO: ABNORMAL /HPF
NRBC BLD AUTO-RTO: 0 /100 WBCS
P AXIS: 58 DEGREES
PH UR STRIP.AUTO: 5.5 [PH]
PLATELET # BLD AUTO: 131 THOUSANDS/UL (ref 149–390)
PMV BLD AUTO: 11 FL (ref 8.9–12.7)
POTASSIUM SERPL-SCNC: 4.2 MMOL/L (ref 3.5–5.3)
PR INTERVAL: 148 MS
PROT SERPL-MCNC: 6 G/DL (ref 6.4–8.4)
PROT UR STRIP-MCNC: ABNORMAL MG/DL
QRS AXIS: 34 DEGREES
QRSD INTERVAL: 90 MS
QT INTERVAL: 458 MS
QTC INTERVAL: 508 MS
RBC # BLD AUTO: 4.67 MILLION/UL (ref 3.88–5.62)
RBC #/AREA URNS AUTO: ABNORMAL /HPF
SODIUM SERPL-SCNC: 137 MMOL/L (ref 135–147)
SP GR UR STRIP.AUTO: >=1.03 (ref 1–1.03)
T WAVE AXIS: 53 DEGREES
UROBILINOGEN UR STRIP-ACNC: 4 MG/DL
VENTRICULAR RATE: 74 BPM
WBC # BLD AUTO: 9.68 THOUSAND/UL (ref 4.31–10.16)
WBC #/AREA URNS AUTO: ABNORMAL /HPF

## 2024-02-26 PROCEDURE — 96365 THER/PROPH/DIAG IV INF INIT: CPT

## 2024-02-26 PROCEDURE — 93005 ELECTROCARDIOGRAM TRACING: CPT

## 2024-02-26 PROCEDURE — 96375 TX/PRO/DX INJ NEW DRUG ADDON: CPT

## 2024-02-26 PROCEDURE — 80053 COMPREHEN METABOLIC PANEL: CPT | Performed by: EMERGENCY MEDICINE

## 2024-02-26 PROCEDURE — 85025 COMPLETE CBC W/AUTO DIFF WBC: CPT | Performed by: EMERGENCY MEDICINE

## 2024-02-26 PROCEDURE — 96376 TX/PRO/DX INJ SAME DRUG ADON: CPT

## 2024-02-26 PROCEDURE — 99285 EMERGENCY DEPT VISIT HI MDM: CPT

## 2024-02-26 PROCEDURE — 81001 URINALYSIS AUTO W/SCOPE: CPT | Performed by: EMERGENCY MEDICINE

## 2024-02-26 PROCEDURE — 96361 HYDRATE IV INFUSION ADD-ON: CPT

## 2024-02-26 PROCEDURE — 36415 COLL VENOUS BLD VENIPUNCTURE: CPT

## 2024-02-26 PROCEDURE — 74174 CTA ABD&PLVS W/CONTRAST: CPT

## 2024-02-26 PROCEDURE — 84484 ASSAY OF TROPONIN QUANT: CPT | Performed by: EMERGENCY MEDICINE

## 2024-02-26 PROCEDURE — 71046 X-RAY EXAM CHEST 2 VIEWS: CPT

## 2024-02-26 PROCEDURE — 93010 ELECTROCARDIOGRAM REPORT: CPT | Performed by: INTERNAL MEDICINE

## 2024-02-26 PROCEDURE — 71275 CT ANGIOGRAPHY CHEST: CPT

## 2024-02-26 RX ORDER — ACETAMINOPHEN 325 MG/1
650 TABLET ORAL ONCE
Status: COMPLETED | OUTPATIENT
Start: 2024-02-26 | End: 2024-02-26

## 2024-02-26 RX ORDER — OXYCODONE HYDROCHLORIDE 5 MG/1
5 TABLET ORAL ONCE
Status: COMPLETED | OUTPATIENT
Start: 2024-02-26 | End: 2024-02-26

## 2024-02-26 RX ORDER — KETOROLAC TROMETHAMINE 30 MG/ML
15 INJECTION, SOLUTION INTRAMUSCULAR; INTRAVENOUS ONCE
Status: COMPLETED | OUTPATIENT
Start: 2024-02-26 | End: 2024-02-26

## 2024-02-26 RX ORDER — ONDANSETRON 2 MG/ML
4 INJECTION INTRAMUSCULAR; INTRAVENOUS ONCE
Status: COMPLETED | OUTPATIENT
Start: 2024-02-26 | End: 2024-02-26

## 2024-02-26 RX ORDER — TAMSULOSIN HYDROCHLORIDE 0.4 MG/1
0.4 CAPSULE ORAL
Qty: 14 CAPSULE | Refills: 0 | Status: SHIPPED | OUTPATIENT
Start: 2024-02-27 | End: 2024-03-12

## 2024-02-26 RX ORDER — HYDROMORPHONE HCL/PF 1 MG/ML
1 SYRINGE (ML) INJECTION ONCE
Status: COMPLETED | OUTPATIENT
Start: 2024-02-26 | End: 2024-02-26

## 2024-02-26 RX ORDER — ONDANSETRON 4 MG/1
4 TABLET, ORALLY DISINTEGRATING ORAL EVERY 6 HOURS PRN
Qty: 20 TABLET | Refills: 0 | Status: SHIPPED | OUTPATIENT
Start: 2024-02-26 | End: 2024-03-02

## 2024-02-26 RX ORDER — IBUPROFEN 600 MG/1
TABLET ORAL
Qty: 18 TABLET | Refills: 0 | Status: SHIPPED | OUTPATIENT
Start: 2024-02-26 | End: 2024-03-02

## 2024-02-26 RX ORDER — TAMSULOSIN HYDROCHLORIDE 0.4 MG/1
0.4 CAPSULE ORAL
Qty: 14 CAPSULE | Refills: 0 | Status: SHIPPED | OUTPATIENT
Start: 2024-02-27 | End: 2024-02-26

## 2024-02-26 RX ORDER — OXYCODONE HYDROCHLORIDE 5 MG/1
5 TABLET ORAL EVERY 6 HOURS PRN
Qty: 12 TABLET | Refills: 0 | Status: SHIPPED | OUTPATIENT
Start: 2024-02-26 | End: 2024-02-29

## 2024-02-26 RX ORDER — TAMSULOSIN HYDROCHLORIDE 0.4 MG/1
0.4 CAPSULE ORAL ONCE
Status: COMPLETED | OUTPATIENT
Start: 2024-02-26 | End: 2024-02-26

## 2024-02-26 RX ORDER — HYDROMORPHONE HCL/PF 1 MG/ML
0.5 SYRINGE (ML) INJECTION ONCE
Status: COMPLETED | OUTPATIENT
Start: 2024-02-26 | End: 2024-02-26

## 2024-02-26 RX ORDER — ACETAMINOPHEN 500 MG
1000 TABLET ORAL 3 TIMES DAILY
Qty: 30 TABLET | Refills: 0 | Status: SHIPPED | OUTPATIENT
Start: 2024-02-26 | End: 2024-03-02

## 2024-02-26 RX ADMIN — HYDROMORPHONE HYDROCHLORIDE 0.5 MG: 1 INJECTION, SOLUTION INTRAMUSCULAR; INTRAVENOUS; SUBCUTANEOUS at 16:38

## 2024-02-26 RX ADMIN — ONDANSETRON 4 MG: 2 INJECTION INTRAMUSCULAR; INTRAVENOUS at 16:38

## 2024-02-26 RX ADMIN — SODIUM CHLORIDE, SODIUM LACTATE, POTASSIUM CHLORIDE, AND CALCIUM CHLORIDE 1000 ML: .6; .31; .03; .02 INJECTION, SOLUTION INTRAVENOUS at 19:14

## 2024-02-26 RX ADMIN — TAMSULOSIN HYDROCHLORIDE 0.4 MG: 0.4 CAPSULE ORAL at 20:17

## 2024-02-26 RX ADMIN — ACETAMINOPHEN 650 MG: 325 TABLET ORAL at 20:17

## 2024-02-26 RX ADMIN — SODIUM CHLORIDE 1000 ML: 0.9 INJECTION, SOLUTION INTRAVENOUS at 16:37

## 2024-02-26 RX ADMIN — HYDROMORPHONE HYDROCHLORIDE 1 MG: 1 INJECTION, SOLUTION INTRAMUSCULAR; INTRAVENOUS; SUBCUTANEOUS at 17:33

## 2024-02-26 RX ADMIN — IOHEXOL 100 ML: 350 INJECTION, SOLUTION INTRAVENOUS at 16:56

## 2024-02-26 RX ADMIN — KETOROLAC TROMETHAMINE 15 MG: 30 INJECTION, SOLUTION INTRAMUSCULAR; INTRAVENOUS at 19:12

## 2024-02-26 RX ADMIN — ONDANSETRON 4 MG: 2 INJECTION INTRAMUSCULAR; INTRAVENOUS at 17:34

## 2024-02-26 RX ADMIN — OXYCODONE HYDROCHLORIDE 5 MG: 5 TABLET ORAL at 20:17

## 2024-02-26 NOTE — ED PROVIDER NOTES
"History  Chief Complaint   Patient presents with    Flank Pain     Patient comoplaint of flank pain and blood in urine this am , chest pain approx  1 hr     The patient is a 54-year-old male with PMH of HTN, HLD, and nephrolithiasis presenting for evaluation of 1 day of chest pain and flank pain.  Patient started 11 AM with left flank pain described as constant, \"like a knife\", 10 out of 10 radiating to the left side.  Approximate 1 hour after starting with his pain he developed left-sided crushing chest pain.  He has had some diaphoresis per wife as well as nausea and vomiting (approximately 5 episodes).  He endorses associated urinary urgency and hematuria but denies dysuria and foul-smelling urine.  He denies fevers but has not taken his temperature.  He denies headache, lightheadedness/dizziness, shortness of breath, skin changes, leg swelling.  He denies falls and trauma.      History provided by:  Patient and significant other   used: No        Prior to Admission Medications   Prescriptions Last Dose Informant Patient Reported? Taking?   FAMOTIDINE PO  Self Yes No   Sig: Take 20 mg by mouth in the morning   aspirin 81 mg chewable tablet  Self No No   Sig: Chew 1 tablet (81 mg total) daily Do not start before July 14, 2023.   atorvastatin (LIPITOR) 10 mg tablet  Self No No   Sig: Take 1 tablet (10 mg total) by mouth every evening   Patient not taking: Reported on 9/5/2023   chlorthalidone 25 mg tablet  Self Yes No   Sig: Take 25 mg by mouth daily   clopidogrel (PLAVIX) 75 mg tablet  Self No No   Sig: Take 1 tablet (75 mg total) by mouth daily Do not start before July 14, 2023.   Patient not taking: Reported on 9/5/2023   hydrOXYzine HCL (ATARAX) 25 mg tablet  Self Yes No   Sig: Take 25 mg by mouth every 6 (six) hours as needed for anxiety   loperamide (IMODIUM) 2 mg capsule  Self Yes No   Sig: Take 2 mg by mouth 4 (four) times a day as needed for diarrhea   losartan (COZAAR) 100 MG tablet  " Self Yes No   Sig: Take 100 mg by mouth 2 (two) times a day   metoprolol succinate (TOPROL-XL) 100 mg 24 hr tablet  Self Yes No   Sig: Take 100 mg by mouth daily   rOPINIRole (REQUIP) 0.5 mg tablet  Self Yes No   Sig: Take 0.5 mg by mouth daily at bedtime   zolpidem (AMBIEN CR) 12.5 MG CR tablet  Self Yes No   Sig: Take 10 mg by mouth daily at bedtime as needed for sleep      Facility-Administered Medications: None       Past Medical History:   Diagnosis Date    Insomnia        Past Surgical History:   Procedure Laterality Date    WISDOM TOOTH EXTRACTION         History reviewed. No pertinent family history.  I have reviewed and agree with the history as documented.    E-Cigarette/Vaping    E-Cigarette Use Current Every Day User      E-Cigarette/Vaping Substances     Social History     Tobacco Use    Smoking status: Never    Smokeless tobacco: Current     Types: Chew   Vaping Use    Vaping status: Every Day   Substance Use Topics    Alcohol use: Never    Drug use: No       Review of Systems   Constitutional:  Positive for appetite change (Decreased x 1 day) and diaphoresis. Negative for fever.   Respiratory:  Negative for cough and shortness of breath.    Cardiovascular:  Positive for chest pain (Left-sided). Negative for palpitations and leg swelling.   Gastrointestinal:  Positive for abdominal pain (Left-sided), nausea and vomiting. Negative for constipation and diarrhea.   Genitourinary:  Positive for flank pain, frequency and urgency. Negative for decreased urine volume, difficulty urinating, dysuria, penile swelling, scrotal swelling and testicular pain.   Musculoskeletal:  Positive for back pain (Left-sided).   Skin:  Positive for pallor (Per wife). Negative for color change, rash and wound.   Allergic/Immunologic: Negative for immunocompromised state.   Neurological:  Negative for dizziness, syncope, weakness, light-headedness, numbness and headaches.       Physical Exam  Physical Exam  Vitals and nursing  note reviewed.   Constitutional:       General: He is in acute distress.      Appearance: Normal appearance. He is well-developed. He is diaphoretic (Mildly diaphoretic). He is not ill-appearing or toxic-appearing.   HENT:      Head: Normocephalic and atraumatic.      Jaw: There is normal jaw occlusion.      Nose: Nose normal.      Mouth/Throat:      Lips: Pink.      Mouth: Mucous membranes are moist.      Pharynx: Oropharynx is clear.   Eyes:      Extraocular Movements: Extraocular movements intact.      Conjunctiva/sclera: Conjunctivae normal.   Cardiovascular:      Rate and Rhythm: Normal rate and regular rhythm.      Pulses: Normal pulses.           Radial pulses are 2+ on the right side and 2+ on the left side.        Dorsalis pedis pulses are 2+ on the right side and 2+ on the left side.      Heart sounds: Normal heart sounds, S1 normal and S2 normal. No murmur heard.  Pulmonary:      Effort: Pulmonary effort is normal. No tachypnea or respiratory distress.      Breath sounds: Normal breath sounds and air entry. No stridor, decreased air movement or transmitted upper airway sounds. No decreased breath sounds.   Abdominal:      General: There is no distension or abdominal bruit.      Palpations: Abdomen is soft. There is no pulsatile mass.      Tenderness: There is abdominal tenderness in the periumbilical area, left upper quadrant and left lower quadrant. There is left CVA tenderness. There is no right CVA tenderness, guarding or rebound. Negative signs include Shen's sign, Rovsing's sign and McBurney's sign.   Musculoskeletal:         General: Normal range of motion.      Cervical back: Neck supple.      Right lower leg: No edema.      Left lower leg: No edema.   Skin:     General: Skin is warm.      Capillary Refill: Capillary refill takes less than 2 seconds.      Findings: No rash or wound.   Neurological:      General: No focal deficit present.      Mental Status: He is alert and oriented to person,  place, and time. Mental status is at baseline.         Vital Signs  ED Triage Vitals   Temperature Pulse Respirations Blood Pressure SpO2   02/26/24 1733 02/26/24 1415 02/26/24 1415 02/26/24 1415 02/26/24 1415   97.6 °F (36.4 °C) 73 18 163/96 98 %      Temp Source Heart Rate Source Patient Position - Orthostatic VS BP Location FiO2 (%)   02/26/24 1733 02/26/24 1645 02/26/24 1645 02/26/24 1645 --   Oral Monitor Lying Left arm       Pain Score       02/26/24 1600       10 - Worst Possible Pain           Vitals:    02/26/24 1645 02/26/24 1830 02/26/24 1900 02/26/24 2000   BP: (!) 172/99 (!) 176/106 159/95 170/89   Pulse: 80 88 71 93   Patient Position - Orthostatic VS: Lying Lying           Visual Acuity      ED Medications  Medications   sodium chloride 0.9 % bolus 1,000 mL (0 mL Intravenous Stopped 2/26/24 1905)   ondansetron (ZOFRAN) injection 4 mg (4 mg Intravenous Given 2/26/24 1638)   HYDROmorphone (DILAUDID) injection 0.5 mg (0.5 mg Intravenous Given 2/26/24 1638)   iohexol (OMNIPAQUE) 350 MG/ML injection (MULTI-DOSE) 100 mL (100 mL Intravenous Given 2/26/24 1656)   ondansetron (ZOFRAN) injection 4 mg (4 mg Intravenous Given 2/26/24 1734)   HYDROmorphone (DILAUDID) injection 1 mg (1 mg Intravenous Given 2/26/24 1733)   ketorolac (TORADOL) injection 15 mg (15 mg Intravenous Given 2/26/24 1912)   lactated ringers bolus 1,000 mL (0 mL Intravenous Stopped 2/26/24 2020)   tamsulosin (FLOMAX) capsule 0.4 mg (0.4 mg Oral Given 2/26/24 2017)   acetaminophen (TYLENOL) tablet 650 mg (650 mg Oral Given 2/26/24 2017)   oxyCODONE (ROXICODONE) IR tablet 5 mg (5 mg Oral Given 2/26/24 2017)       Diagnostic Studies  Results Reviewed       Procedure Component Value Units Date/Time    HS Troponin I 2hr [[id number]]  (Normal) Collected: 02/26/24 1643    Lab Status: Final result Specimen: Blood from Arm, Right Updated: 02/26/24 1709     hs TnI 2hr 5 ng/L      Delta 2hr hsTnI 1 ng/L     Urine Microscopic [[id number]]  (Abnormal)  Collected: 02/26/24 1420    Lab Status: Final result Specimen: Urine, Clean Catch Updated: 02/26/24 1451     RBC, UA Innumerable /hpf      WBC, UA 4-10 /hpf      Epithelial Cells Occasional /hpf      Bacteria, UA None Seen /hpf      Amorphous Crystals, UA Moderate     Ca Oxalate Viviana, UA Occasional /hpf     HS Troponin 0hr (reflex protocol) [[id number]]  (Normal) Collected: 02/26/24 1418    Lab Status: Final result Specimen: Blood from Arm, Right Updated: 02/26/24 1449     hs TnI 0hr 4 ng/L     UA w Reflex to Microscopic w Reflex to Culture [[id number]]  (Abnormal) Collected: 02/26/24 1420    Lab Status: Final result Specimen: Urine, Clean Catch Updated: 02/26/24 1445     Color, UA Red     Clarity, UA Turbid     Specific Gravity, UA >=1.030     pH, UA 5.5     Leukocytes, UA Small     Nitrite, UA Negative     Protein,  (2+) mg/dl      Glucose, UA Negative mg/dl      Ketones, UA 10 (1+) mg/dl      Urobilinogen, UA 4.0 mg/dl      Bilirubin, UA Negative     Occult Blood, UA Large    Comprehensive metabolic panel [[id number]]  (Abnormal) Collected: 02/26/24 1418    Lab Status: Final result Specimen: Blood from Arm, Right Updated: 02/26/24 1443     Sodium 137 mmol/L      Potassium 4.2 mmol/L      Chloride 106 mmol/L      CO2 24 mmol/L      ANION GAP 7 mmol/L      BUN 12 mg/dL      Creatinine 1.23 mg/dL      Glucose 149 mg/dL      Calcium 8.7 mg/dL      AST 28 U/L      ALT 14 U/L      Alkaline Phosphatase 125 U/L      Total Protein 6.0 g/dL      Albumin 3.5 g/dL      Total Bilirubin 0.83 mg/dL      eGFR 66 ml/min/1.73sq m     Narrative:      National Kidney Disease Foundation guidelines for Chronic Kidney Disease (CKD):     Stage 1 with normal or high GFR (GFR > 90 mL/min/1.73 square meters)    Stage 2 Mild CKD (GFR = 60-89 mL/min/1.73 square meters)    Stage 3A Moderate CKD (GFR = 45-59 mL/min/1.73 square meters)    Stage 3B Moderate CKD (GFR = 30-44 mL/min/1.73 square meters)    Stage 4 Severe CKD (GFR = 15-29  mL/min/1.73 square meters)    Stage 5 End Stage CKD (GFR <15 mL/min/1.73 square meters)  Note: GFR calculation is accurate only with a steady state creatinine    CBC and differential [[id number]]  (Abnormal) Collected: 02/26/24 1418    Lab Status: Final result Specimen: Blood from Arm, Right Updated: 02/26/24 1433     WBC 9.68 Thousand/uL      RBC 4.67 Million/uL      Hemoglobin 11.8 g/dL      Hematocrit 38.0 %      MCV 81 fL      MCH 25.3 pg      MCHC 31.1 g/dL      RDW 16.4 %      MPV 11.0 fL      Platelets 131 Thousands/uL      nRBC 0 /100 WBCs      Neutrophils Relative 83 %      Immat GRANS % 1 %      Lymphocytes Relative 9 %      Monocytes Relative 6 %      Eosinophils Relative 1 %      Basophils Relative 0 %      Neutrophils Absolute 8.07 Thousands/µL      Immature Grans Absolute 0.07 Thousand/uL      Lymphocytes Absolute 0.89 Thousands/µL      Monocytes Absolute 0.57 Thousand/µL      Eosinophils Absolute 0.06 Thousand/µL      Basophils Absolute 0.02 Thousands/µL                    CTA dissection protocol chest/abdomen/pelvis   Final Result by Efraín Alas MD (02/26 1751)      No aortic aneurysm or dissection.      Moderate left hydroureteronephrosis due to a proximal left ureteral calculus measuring 4 mm. Additional punctate intrarenal calculi bilaterally.      Macrolobulated surface contour of the liver suggesting early cirrhotic changes.      Small hiatal hernia.      Splenomegaly.      Small volume abdominopelvic ascites of uncertain etiology.                     Workstation performed: [id number]         XR chest 2 views   Final Result by Efraín Alas MD (02/26 1751)      No acute cardiopulmonary disease.            Workstation performed: [id number]                    Procedures  ECG 12 Lead Documentation Only    Date/Time: 2/26/2024 2:45 PM    Performed by: ROSANA Chapa  Authorized by: ROSANA Chapa    Indications / Diagnosis:  Chest pain  ECG reviewed by me, the ED Provider: yes     Patient location:  ED  Previous ECG:     Previous ECG:  Unavailable    Comparison to cardiac monitor: Yes    Interpretation:     Interpretation: non-specific    Rate:     ECG rate:  74    ECG rate assessment: normal    Rhythm:     Rhythm: sinus rhythm    Ectopy:     Ectopy: none    QRS:     QRS axis:  Normal    QRS intervals:  Normal  Conduction:     Conduction: normal    ST segments:     ST segments:  Normal  T waves:     T waves: normal    Comments:      Sinus rhythm, normal axis, prolonged QTc, no acute ischemic changes read by me           ED Course  ED Course as of 02/26/24 2144   Mon Feb 26, 2024   1721 hs TnI 2hr: 5  Negative delta trop, ACS less likely   1727 On reevaluation, no improvement in pain, will redose Dilaudid as well as Zofran as he is vomited secondary to pain   1758 CTA dissection protocol chest/abdomen/pelvis  IMPRESSION:     No aortic aneurysm or dissection.     Moderate left hydroureteronephrosis due to a proximal left ureteral calculus measuring 4 mm. Additional punctate intrarenal calculi bilaterally.     Macrolobulated surface contour of the liver suggesting early cirrhotic changes.     Small hiatal hernia.     Splenomegaly.     Small volume abdominopelvic ascites of uncertain etiology.     1809 Chloride: 106                               SBIRT 22yo+      Flowsheet Row Most Recent Value   Initial Alcohol Screen: US AUDIT-C     1. How often do you have a drink containing alcohol? 5 Filed at: 02/26/2024 1914   2. How many drinks containing alcohol do you have on a typical day you are drinking?  1 Filed at: 02/26/2024 1914   3a. Male UNDER 65: How often do you have five or more drinks on one occasion? 0 Filed at: 02/26/2024 1914   Audit-C Score 6 Filed at: 02/26/2024 1914   WINIFRED: How many times in the past year have you...    Used an illegal drug or used a prescription medication for non-medical reasons? Never Filed at: 02/26/2024 1914                      Medical Decision Making  DDx  including but not limited to: Ureteral stone, renal colic, UTI, pyelonephritis, diverticulitis, ACS, dissection    Workup notable for proximal left ureteral stone with hydroureteronephrosis.  Patient initially hypertensive, however after pain control obtained blood pressure down trended to 150s to 160s.  UA without evidence of infection.  ACS workup stable with nonischemic EKG and stable troponin.  Suspect left sided renal colic secondary to left ureteral stone.  Patient medicated with Toradol and had notable improvement in pain.  Passed p.o. challenge without difficulty.  Given overall stability, discussed discharge home with supportive care.  He is in agreement with plan and has no further questions at this time.  He is with improved appearance, in no acute distress, and ambulatory with steady gait at time discharge.    Problems Addressed:  Left ureteral stone: acute illness or injury  Renal colic on left side: acute illness or injury    Amount and/or Complexity of Data Reviewed  Independent Historian: zeeshan  Labs: ordered. Decision-making details documented in ED Course.  Radiology: ordered. Decision-making details documented in ED Course.  ECG/medicine tests: ordered and independent interpretation performed.    Risk  OTC drugs.  Prescription drug management.             Disposition  Final diagnoses:   Left ureteral stone   Renal colic on left side     Time reflects when diagnosis was documented in both MDM as applicable and the Disposition within this note       Time User Action Codes Description Comment    2/26/2024  7:53 PM Pooja Adamson Add [N20.1] Left ureteral stone     2/26/2024  7:53 PM Pooja Adamson Add [N23] Renal colic on left side           ED Disposition       ED Disposition   Discharge    Condition   Stable    Date/Time   Mon Feb 26, 2024 1953    Comment   Pablo Mae discharge to home/self care.                   Follow-up Information       Follow up With Specialties Details Why Contact Info  Additional Information    [facility] [place] [facility] Call in 1 day  [address]  [address]  [facility] [address] [phone] [facility] [place], [address], [address], [place], [place], [address] [phone]     [facility] Emergency Department Emergency Medicine Go to  If symptoms worsen [address] [facility] [address] [phone] [facility] Emergency Department, [address] [place], [place] [address]            Discharge Medication List as of 2/26/2024  7:58 PM        START taking these medications    Details   acetaminophen (TYLENOL) 500 mg tablet Take 2 tablets (1,000 mg total) by mouth 3 (three) times a day for 5 days, Starting Mon 2/26/2024, Until Sat 3/2/2024, Normal      ibuprofen (MOTRIN) 600 mg tablet Multiple Dosages:Starting Mon 2/26/2024, Until Wed 2/28/2024, THEN Starting Thu 2/29/2024, Until Sat 3/2/2024Take 1 tablet (600 mg total) by mouth 3 (three) times a day with meals for 3 days, THEN 1 tablet (600 mg total) 3 (three) times a day as neede d for mild pain for up to 3 days., Normal      ondansetron (ZOFRAN-ODT) 4 mg disintegrating tablet Take 1 tablet (4 mg total) by mouth every 6 (six) hours as needed for nausea or vomiting for up to 5 days, Starting Mon 2/26/2024, Until Sat 3/2/2024 at 2359, Normal      oxyCODONE (Roxicodone) 5 immediate release tablet Take 1 tablet (5 mg total) by mouth every 6 (six) hours as needed for severe pain for up to 3 days Max Daily Amount: 20 mg, Starting Mon 2/26/2024, Until Thu 2/29/2024 at 2359, Normal           CONTINUE these medications which have CHANGED    Details   tamsulosin (FLOMAX) 0.4 mg Take 1 capsule (0.4 mg total) by mouth daily with dinner for 14 days Do not start before February 27, 2024., Starting Tue 2/27/2024, Until Tue 3/12/2024, Normal           CONTINUE these medications which have NOT CHANGED     Details   aspirin 81 mg chewable tablet Chew 1 tablet (81 mg total) daily Do not start before July 14, 2023., Starting Fri 7/14/2023, Normal      atorvastatin (LIPITOR) 10 mg tablet Take 1 tablet (10 mg total) by mouth every evening, Starting Thu 7/13/2023, Normal      chlorthalidone 25 mg tablet Take 25 mg by mouth daily, Historical Med      clopidogrel (PLAVIX) 75 mg tablet Take 1 tablet (75 mg total) by mouth daily Do not start before July 14, 2023., Starting Fri 7/14/2023, Normal      FAMOTIDINE PO Take 20 mg by mouth in the morning, Historical Med      hydrOXYzine HCL (ATARAX) 25 mg tablet Take 25 mg by mouth every 6 (six) hours as needed for anxiety, Historical Med      loperamide (IMODIUM) 2 mg capsule Take 2 mg by mouth 4 (four) times a day as needed for diarrhea, Historical Med      losartan (COZAAR) 100 MG tablet Take 100 mg by mouth 2 (two) times a day, Historical Med      metoprolol succinate (TOPROL-XL) 100 mg 24 hr tablet Take 100 mg by mouth daily, Historical Med      rOPINIRole (REQUIP) 0.5 mg tablet Take 0.5 mg by mouth daily at bedtime, Historical Med      zolpidem (AMBIEN CR) 12.5 MG CR tablet Take 10 mg by mouth daily at bedtime as needed for sleep, Historical Med                 PDMP Review         Value Time User    PDMP Reviewed  Yes 7/13/2023  1:15 PM Susan Solis PA-C            ED Provider  Electronically Signed by             ROSANA Chapa  02/26/24 [id number]

## 2024-02-26 NOTE — Clinical Note
Pablo Mae was seen and treated in our emergency department on 2/26/2024.    No restrictions            Diagnosis:     Pablo  may return to work on return date.    He may return on this date: 03/01/2024         If you have any questions or concerns, please don't hesitate to call.      Brandon Simpson RN    ______________________________           _______________          _______________  Hospital Representative                              Date                                Time

## 2024-02-27 NOTE — DISCHARGE INSTRUCTIONS
Schedule an appointment with urology for follow-up regarding your left-sided kidney stone.  Use the prescribed occasions for supportive care.  Return to the ER if develop severe pain, fever, vomiting, weakness, confusion, or lethargy.

## 2024-03-04 ENCOUNTER — TELEPHONE (OUTPATIENT)
Age: 54
End: 2024-03-04

## 2024-03-04 NOTE — TELEPHONE ENCOUNTER
Patient prefers [place] location however no availability at various locations until April. Please assist in scheduling in recommended appropriate time frame of 1-2 weeks.

## 2024-03-04 NOTE — TELEPHONE ENCOUNTER
3/28 appoint with Dr. Shen is a reasonable appointment.  Patient should be provided ER precautions.  Strain all his urine and hydrate with at least 2 L of water a day.  Should have been given Flomax which she should take daily

## 2024-03-04 NOTE — TELEPHONE ENCOUNTER
New Patient    What is the reason for the patient’s appointment?: NP- left ureteral stone     What office location does the patient prefer?: [place] if possible     Does patient have Imaging/Lab Results:    CT done on 2/26/24    IMPRESSION:     No aortic aneurysm or dissection.     Moderate left hydroureteronephrosis due to a proximal left ureteral calculus measuring 4 mm. Additional punctate intrarenal calculi bilaterally.     Macrolobulated surface contour of the liver suggesting early cirrhotic changes.     Small hiatal hernia.     Splenomegaly.     Small volume abdominopelvic ascites of uncertain etiology.       Have patient records been requested?:  If No, are the records showing in Epic: Records in epic       HISTORY:   Has the patient had any previous Urologist(s)?: Yes 2006 [facility] urology - not sure if records are still available due to time frame     Was the patient seen in the ED?: yes 2/26/24    Please review patients ER visit from 2/26/24 and any lab work and imaging and call patient to further triage and schedule apt in appropriate time frame. Referral is in chart.     CB: [phone]

## 2024-03-04 NOTE — TELEPHONE ENCOUNTER
Call placed to patient and spoke with him. Informed him of the AP recommendations. Pt is aware and confirmed appointment for 3-